# Patient Record
Sex: FEMALE | Race: WHITE | NOT HISPANIC OR LATINO
[De-identification: names, ages, dates, MRNs, and addresses within clinical notes are randomized per-mention and may not be internally consistent; named-entity substitution may affect disease eponyms.]

---

## 2021-09-24 PROBLEM — Z00.00 ENCOUNTER FOR PREVENTIVE HEALTH EXAMINATION: Status: ACTIVE | Noted: 2021-09-24

## 2024-05-10 ENCOUNTER — NON-APPOINTMENT (OUTPATIENT)
Age: 59
End: 2024-05-10

## 2024-05-15 ENCOUNTER — INPATIENT (INPATIENT)
Facility: HOSPITAL | Age: 59
LOS: 8 days | Discharge: HOME | DRG: 195 | End: 2024-05-24
Attending: STUDENT IN AN ORGANIZED HEALTH CARE EDUCATION/TRAINING PROGRAM | Admitting: STUDENT IN AN ORGANIZED HEALTH CARE EDUCATION/TRAINING PROGRAM
Payer: COMMERCIAL

## 2024-05-15 VITALS
HEART RATE: 92 BPM | OXYGEN SATURATION: 84 % | RESPIRATION RATE: 22 BRPM | WEIGHT: 154.98 LBS | HEIGHT: 60 IN | DIASTOLIC BLOOD PRESSURE: 80 MMHG | TEMPERATURE: 99 F | SYSTOLIC BLOOD PRESSURE: 138 MMHG

## 2024-05-15 DIAGNOSIS — A41.9 SEPSIS, UNSPECIFIED ORGANISM: ICD-10-CM

## 2024-05-15 DIAGNOSIS — E87.1 HYPO-OSMOLALITY AND HYPONATREMIA: ICD-10-CM

## 2024-05-15 DIAGNOSIS — J96.01 ACUTE RESPIRATORY FAILURE WITH HYPOXIA: ICD-10-CM

## 2024-05-15 DIAGNOSIS — R74.01 ELEVATION OF LEVELS OF LIVER TRANSAMINASE LEVELS: ICD-10-CM

## 2024-05-15 DIAGNOSIS — Z29.9 ENCOUNTER FOR PROPHYLACTIC MEASURES, UNSPECIFIED: ICD-10-CM

## 2024-05-15 DIAGNOSIS — I10 ESSENTIAL (PRIMARY) HYPERTENSION: ICD-10-CM

## 2024-05-15 DIAGNOSIS — J18.9 PNEUMONIA, UNSPECIFIED ORGANISM: ICD-10-CM

## 2024-05-15 LAB
ADD ON TEST-SPECIMEN IN LAB: SIGNIFICANT CHANGE UP
ALBUMIN SERPL ELPH-MCNC: 3.5 G/DL — SIGNIFICANT CHANGE UP (ref 3.3–5)
ALP SERPL-CCNC: 106 U/L — SIGNIFICANT CHANGE UP (ref 40–120)
ALT FLD-CCNC: 77 U/L — HIGH (ref 10–45)
ANION GAP SERPL CALC-SCNC: 14 MMOL/L — SIGNIFICANT CHANGE UP (ref 5–17)
ANION GAP SERPL CALC-SCNC: 15 MMOL/L — SIGNIFICANT CHANGE UP (ref 5–17)
APPEARANCE UR: CLEAR — SIGNIFICANT CHANGE UP
APTT BLD: 28.4 SEC — SIGNIFICANT CHANGE UP (ref 24.5–35.6)
AST SERPL-CCNC: 41 U/L — HIGH (ref 10–40)
BACTERIA # UR AUTO: NEGATIVE /HPF — SIGNIFICANT CHANGE UP
BASE EXCESS BLDV CALC-SCNC: 1.3 MMOL/L — SIGNIFICANT CHANGE UP (ref -2–3)
BASOPHILS # BLD AUTO: 0.04 K/UL — SIGNIFICANT CHANGE UP (ref 0–0.2)
BASOPHILS NFR BLD AUTO: 0.2 % — SIGNIFICANT CHANGE UP (ref 0–2)
BILIRUB SERPL-MCNC: 0.7 MG/DL — SIGNIFICANT CHANGE UP (ref 0.2–1.2)
BILIRUB UR-MCNC: NEGATIVE — SIGNIFICANT CHANGE UP
BUN SERPL-MCNC: 6 MG/DL — LOW (ref 7–23)
BUN SERPL-MCNC: 8 MG/DL — SIGNIFICANT CHANGE UP (ref 7–23)
CA-I SERPL-SCNC: 1.14 MMOL/L — LOW (ref 1.15–1.33)
CALCIUM SERPL-MCNC: 8.6 MG/DL — SIGNIFICANT CHANGE UP (ref 8.4–10.5)
CALCIUM SERPL-MCNC: 9.1 MG/DL — SIGNIFICANT CHANGE UP (ref 8.4–10.5)
CAST: 2 /LPF — SIGNIFICANT CHANGE UP (ref 0–4)
CHLORIDE BLDV-SCNC: 84 MMOL/L — LOW (ref 96–108)
CHLORIDE SERPL-SCNC: 84 MMOL/L — LOW (ref 96–108)
CHLORIDE SERPL-SCNC: 87 MMOL/L — LOW (ref 96–108)
CO2 BLDV-SCNC: 28 MMOL/L — HIGH (ref 22–26)
CO2 SERPL-SCNC: 22 MMOL/L — SIGNIFICANT CHANGE UP (ref 22–31)
CO2 SERPL-SCNC: 24 MMOL/L — SIGNIFICANT CHANGE UP (ref 22–31)
COLOR SPEC: YELLOW — SIGNIFICANT CHANGE UP
CREAT SERPL-MCNC: 0.5 MG/DL — SIGNIFICANT CHANGE UP (ref 0.5–1.3)
CREAT SERPL-MCNC: 0.51 MG/DL — SIGNIFICANT CHANGE UP (ref 0.5–1.3)
DIFF PNL FLD: ABNORMAL
EGFR: 108 ML/MIN/1.73M2 — SIGNIFICANT CHANGE UP
EGFR: 109 ML/MIN/1.73M2 — SIGNIFICANT CHANGE UP
EOSINOPHIL # BLD AUTO: 0.14 K/UL — SIGNIFICANT CHANGE UP (ref 0–0.5)
EOSINOPHIL NFR BLD AUTO: 0.6 % — SIGNIFICANT CHANGE UP (ref 0–6)
FLUAV AG NPH QL: SIGNIFICANT CHANGE UP
FLUBV AG NPH QL: SIGNIFICANT CHANGE UP
GAS PNL BLDV: 118 MMOL/L — CRITICAL LOW (ref 136–145)
GAS PNL BLDV: SIGNIFICANT CHANGE UP
GLUCOSE BLDV-MCNC: 94 MG/DL — SIGNIFICANT CHANGE UP (ref 70–99)
GLUCOSE SERPL-MCNC: 102 MG/DL — HIGH (ref 70–99)
GLUCOSE SERPL-MCNC: 96 MG/DL — SIGNIFICANT CHANGE UP (ref 70–99)
GLUCOSE UR QL: NEGATIVE MG/DL — SIGNIFICANT CHANGE UP
HCO3 BLDV-SCNC: 27 MMOL/L — SIGNIFICANT CHANGE UP (ref 22–29)
HCT VFR BLD CALC: 34.9 % — SIGNIFICANT CHANGE UP (ref 34.5–45)
HCT VFR BLDA CALC: 37 % — SIGNIFICANT CHANGE UP (ref 34.5–46.5)
HGB BLD CALC-MCNC: 12.2 G/DL — SIGNIFICANT CHANGE UP (ref 11.7–16.1)
HGB BLD-MCNC: 12.2 G/DL — SIGNIFICANT CHANGE UP (ref 11.5–15.5)
IMM GRANULOCYTES NFR BLD AUTO: 3.1 % — HIGH (ref 0–0.9)
INR BLD: 1.26 RATIO — HIGH (ref 0.85–1.18)
KETONES UR-MCNC: ABNORMAL MG/DL
LACTATE BLDV-MCNC: 2 MMOL/L — SIGNIFICANT CHANGE UP (ref 0.5–2)
LACTATE SERPL-SCNC: 1.4 MMOL/L — SIGNIFICANT CHANGE UP (ref 0.5–2)
LEGIONELLA AG UR QL: NEGATIVE — SIGNIFICANT CHANGE UP
LEUKOCYTE ESTERASE UR-ACNC: NEGATIVE — SIGNIFICANT CHANGE UP
LYMPHOCYTES # BLD AUTO: 1.21 K/UL — SIGNIFICANT CHANGE UP (ref 1–3.3)
LYMPHOCYTES # BLD AUTO: 5.5 % — LOW (ref 13–44)
MCHC RBC-ENTMCNC: 29.5 PG — SIGNIFICANT CHANGE UP (ref 27–34)
MCHC RBC-ENTMCNC: 35 GM/DL — SIGNIFICANT CHANGE UP (ref 32–36)
MCV RBC AUTO: 84.5 FL — SIGNIFICANT CHANGE UP (ref 80–100)
MONOCYTES # BLD AUTO: 0.68 K/UL — SIGNIFICANT CHANGE UP (ref 0–0.9)
MONOCYTES NFR BLD AUTO: 3.1 % — SIGNIFICANT CHANGE UP (ref 2–14)
NEUTROPHILS # BLD AUTO: 19.21 K/UL — HIGH (ref 1.8–7.4)
NEUTROPHILS NFR BLD AUTO: 87.5 % — HIGH (ref 43–77)
NITRITE UR-MCNC: NEGATIVE — SIGNIFICANT CHANGE UP
NRBC # BLD: 0 /100 WBCS — SIGNIFICANT CHANGE UP (ref 0–0)
OSMOLALITY SERPL: 250 MOSMOL/KG — LOW (ref 275–300)
OSMOLALITY UR: 350 MOS/KG — SIGNIFICANT CHANGE UP (ref 300–900)
PCO2 BLDV: 44 MMHG — HIGH (ref 39–42)
PH BLDV: 7.39 — SIGNIFICANT CHANGE UP (ref 7.32–7.43)
PH UR: 6 — SIGNIFICANT CHANGE UP (ref 5–8)
PLATELET # BLD AUTO: 389 K/UL — SIGNIFICANT CHANGE UP (ref 150–400)
PO2 BLDV: 30 MMHG — SIGNIFICANT CHANGE UP (ref 25–45)
POTASSIUM BLDV-SCNC: 3.5 MMOL/L — SIGNIFICANT CHANGE UP (ref 3.5–5.1)
POTASSIUM SERPL-MCNC: 3.6 MMOL/L — SIGNIFICANT CHANGE UP (ref 3.5–5.3)
POTASSIUM SERPL-MCNC: 3.6 MMOL/L — SIGNIFICANT CHANGE UP (ref 3.5–5.3)
POTASSIUM SERPL-SCNC: 3.6 MMOL/L — SIGNIFICANT CHANGE UP (ref 3.5–5.3)
POTASSIUM SERPL-SCNC: 3.6 MMOL/L — SIGNIFICANT CHANGE UP (ref 3.5–5.3)
PROCALCITONIN SERPL-MCNC: 0.24 NG/ML — HIGH (ref 0.02–0.1)
PROT SERPL-MCNC: 7 G/DL — SIGNIFICANT CHANGE UP (ref 6–8.3)
PROT UR-MCNC: 30 MG/DL
PROTHROM AB SERPL-ACNC: 13.8 SEC — HIGH (ref 9.5–13)
RBC # BLD: 4.13 M/UL — SIGNIFICANT CHANGE UP (ref 3.8–5.2)
RBC # FLD: 12.9 % — SIGNIFICANT CHANGE UP (ref 10.3–14.5)
RBC CASTS # UR COMP ASSIST: 6 /HPF — HIGH (ref 0–4)
RSV RNA NPH QL NAA+NON-PROBE: SIGNIFICANT CHANGE UP
SAO2 % BLDV: 43.2 % — LOW (ref 67–88)
SARS-COV-2 RNA SPEC QL NAA+PROBE: SIGNIFICANT CHANGE UP
SODIUM SERPL-SCNC: 123 MMOL/L — LOW (ref 135–145)
SODIUM SERPL-SCNC: 123 MMOL/L — LOW (ref 135–145)
SODIUM UR-SCNC: 78 MMOL/L — SIGNIFICANT CHANGE UP
SP GR SPEC: 1.01 — SIGNIFICANT CHANGE UP (ref 1–1.03)
SQUAMOUS # UR AUTO: 2 /HPF — SIGNIFICANT CHANGE UP (ref 0–5)
UROBILINOGEN FLD QL: 0.2 MG/DL — SIGNIFICANT CHANGE UP (ref 0.2–1)
WBC # BLD: 21.95 K/UL — HIGH (ref 3.8–10.5)
WBC # FLD AUTO: 21.95 K/UL — HIGH (ref 3.8–10.5)
WBC UR QL: 1 /HPF — SIGNIFICANT CHANGE UP (ref 0–5)

## 2024-05-15 PROCEDURE — 99285 EMERGENCY DEPT VISIT HI MDM: CPT

## 2024-05-15 PROCEDURE — 99223 1ST HOSP IP/OBS HIGH 75: CPT | Mod: GC

## 2024-05-15 PROCEDURE — 71046 X-RAY EXAM CHEST 2 VIEWS: CPT | Mod: 26

## 2024-05-15 RX ORDER — SODIUM CHLORIDE 9 MG/ML
1400 INJECTION INTRAMUSCULAR; INTRAVENOUS; SUBCUTANEOUS ONCE
Refills: 0 | Status: COMPLETED | OUTPATIENT
Start: 2024-05-15 | End: 2024-05-15

## 2024-05-15 RX ORDER — AZITHROMYCIN 500 MG/1
500 TABLET, FILM COATED ORAL ONCE
Refills: 0 | Status: COMPLETED | OUTPATIENT
Start: 2024-05-15 | End: 2024-05-15

## 2024-05-15 RX ORDER — SODIUM CHLORIDE 9 MG/ML
1 INJECTION INTRAMUSCULAR; INTRAVENOUS; SUBCUTANEOUS
Refills: 0 | Status: DISCONTINUED | OUTPATIENT
Start: 2024-05-15 | End: 2024-05-16

## 2024-05-15 RX ORDER — IPRATROPIUM/ALBUTEROL SULFATE 18-103MCG
3 AEROSOL WITH ADAPTER (GRAM) INHALATION ONCE
Refills: 0 | Status: COMPLETED | OUTPATIENT
Start: 2024-05-15 | End: 2024-05-15

## 2024-05-15 RX ORDER — ONDANSETRON 8 MG/1
4 TABLET, FILM COATED ORAL EVERY 8 HOURS
Refills: 0 | Status: DISCONTINUED | OUTPATIENT
Start: 2024-05-15 | End: 2024-05-24

## 2024-05-15 RX ORDER — AZITHROMYCIN 500 MG/1
500 TABLET, FILM COATED ORAL DAILY
Refills: 0 | Status: COMPLETED | OUTPATIENT
Start: 2024-05-16 | End: 2024-05-17

## 2024-05-15 RX ORDER — ACETAMINOPHEN 500 MG
1000 TABLET ORAL ONCE
Refills: 0 | Status: COMPLETED | OUTPATIENT
Start: 2024-05-15 | End: 2024-05-15

## 2024-05-15 RX ORDER — IPRATROPIUM/ALBUTEROL SULFATE 18-103MCG
3 AEROSOL WITH ADAPTER (GRAM) INHALATION EVERY 6 HOURS
Refills: 0 | Status: DISCONTINUED | OUTPATIENT
Start: 2024-05-15 | End: 2024-05-16

## 2024-05-15 RX ORDER — ATORVASTATIN CALCIUM 80 MG/1
1 TABLET, FILM COATED ORAL
Refills: 0 | DISCHARGE

## 2024-05-15 RX ORDER — ATORVASTATIN CALCIUM 80 MG/1
10 TABLET, FILM COATED ORAL AT BEDTIME
Refills: 0 | Status: DISCONTINUED | OUTPATIENT
Start: 2024-05-15 | End: 2024-05-24

## 2024-05-15 RX ORDER — POTASSIUM CHLORIDE 20 MEQ
40 PACKET (EA) ORAL ONCE
Refills: 0 | Status: COMPLETED | OUTPATIENT
Start: 2024-05-15 | End: 2024-05-15

## 2024-05-15 RX ORDER — CEFTRIAXONE 500 MG/1
1000 INJECTION, POWDER, FOR SOLUTION INTRAMUSCULAR; INTRAVENOUS EVERY 24 HOURS
Refills: 0 | Status: COMPLETED | OUTPATIENT
Start: 2024-05-16 | End: 2024-05-17

## 2024-05-15 RX ORDER — ACETAMINOPHEN 500 MG
650 TABLET ORAL ONCE
Refills: 0 | Status: COMPLETED | OUTPATIENT
Start: 2024-05-15 | End: 2024-05-15

## 2024-05-15 RX ORDER — CEFTRIAXONE 500 MG/1
1000 INJECTION, POWDER, FOR SOLUTION INTRAMUSCULAR; INTRAVENOUS ONCE
Refills: 0 | Status: COMPLETED | OUTPATIENT
Start: 2024-05-15 | End: 2024-05-15

## 2024-05-15 RX ORDER — ENOXAPARIN SODIUM 100 MG/ML
40 INJECTION SUBCUTANEOUS ONCE
Refills: 0 | Status: COMPLETED | OUTPATIENT
Start: 2024-05-15 | End: 2024-05-15

## 2024-05-15 RX ADMIN — Medication 650 MILLIGRAM(S): at 14:53

## 2024-05-15 RX ADMIN — SODIUM CHLORIDE 1400 MILLILITER(S): 9 INJECTION INTRAMUSCULAR; INTRAVENOUS; SUBCUTANEOUS at 11:28

## 2024-05-15 RX ADMIN — Medication 3 MILLILITER(S): at 15:50

## 2024-05-15 RX ADMIN — Medication 40 MILLIEQUIVALENT(S): at 21:16

## 2024-05-15 RX ADMIN — CEFTRIAXONE 100 MILLIGRAM(S): 500 INJECTION, POWDER, FOR SOLUTION INTRAMUSCULAR; INTRAVENOUS at 11:25

## 2024-05-15 RX ADMIN — Medication 1000 MILLIGRAM(S): at 22:33

## 2024-05-15 RX ADMIN — Medication 400 MILLIGRAM(S): at 21:16

## 2024-05-15 RX ADMIN — ATORVASTATIN CALCIUM 10 MILLIGRAM(S): 80 TABLET, FILM COATED ORAL at 21:16

## 2024-05-15 RX ADMIN — SODIUM CHLORIDE 1 GRAM(S): 9 INJECTION INTRAMUSCULAR; INTRAVENOUS; SUBCUTANEOUS at 21:35

## 2024-05-15 RX ADMIN — ENOXAPARIN SODIUM 40 MILLIGRAM(S): 100 INJECTION SUBCUTANEOUS at 21:19

## 2024-05-15 RX ADMIN — AZITHROMYCIN 500 MILLIGRAM(S): 500 TABLET, FILM COATED ORAL at 13:59

## 2024-05-15 NOTE — ED PROVIDER NOTE - PHYSICAL EXAMINATION
CONSTITUTIONAL: In no apparent distress.  ENT: Airway patent, moist mucous membranes.   EYES: Pupils equal, round and reactive to light. EOMI. Conjunctiva normal appearing.   CARDIAC: Normal rate, regular rhythm.  Heart sounds S1, S2.    RESPIRATORY:  Diffuse rhonchi. Hypoxic to 88% on RA, improved to 95% on 3 L nasal cannula. No accessory muscle use. Speaking in full sentences. No resp distress.   GASTROINTESTINAL: Abdomen soft, non-tender, not distended.  MUSCULOSKELETAL: Spine appears normal. Legs symmetric appearing. No BLE edema or swelling. No calf TTP.   NEUROLOGICAL: Alert and oriented x3, non focal.

## 2024-05-15 NOTE — H&P ADULT - PROBLEM SELECTOR PLAN 1
On 4L NC, productive cough  CURB65 0, PSI: 48 points -> grade 2  Wells score 0  Etiology: Acute Bacterial Pneumonia (legionella high possibility) vs PE. Less common AIP. Low likelihood of cardiac etiology    Plan:  -ceftriaxone 1 g IV for 5 day course  -azithromycin for 3 day course  -duoneb 3g q6h  -if no resolution of symptoms consider CT Chest or CTPE  -f/u MRSA, urine legionella, urine strep  -f/u blood cultures, UA On 4L NC, productive cough  CURB65 0, PSI: 48 points -> grade 2  Wells score 1  Etiology: Acute Bacterial Pneumonia (legionella high possibility) vs PE. Less common AIP. Low likelihood of cardiac etiology    Plan:  -ceftriaxone 1 g IV for 5 day course  -azithromycin for 3 day course  -duoneb 3g q6h PRN for dyspnea  -if no resolution or worsening of symptoms consider CTPE  -f/u MRSA, urine legionella  -f/u blood cultures, UA On 4L NC, productive cough  CURB65 0, PSI: 48 points -> grade 2  Wells score 1, BNP and trops unremarkable  Etiology: Acute Bacterial Pneumonia (legionella high possibility) vs PE. Less common AIP. Low likelihood of cardiac etiology    Plan:  -ceftriaxone 1 g IV for 5 day course  -azithromycin for 3 day course  -duoneb 3g q6h PRN for dyspnea  -if no resolution or worsening of symptoms consider CTPE  -f/u MRSA, urine legionella  -f/u blood cultures, UA

## 2024-05-15 NOTE — H&P ADULT - NSHPLABSRESULTS_GEN_ALL_CORE
(05-15 @ 11:54)                      12.2  21.95 )-----------( 389                 34.9    Neutrophils = 19.21 (87.5%)  Lymphocytes = 1.21 (5.5%)  Eosinophils = 0.14 (0.6%)  Basophils = 0.04 (0.2%)  Monocytes = 0.68 (3.1%)  Bands = --%    05-15    123<L>  |  84<L>  |  8   ----------------------------<  102<H>  3.6   |  24  |  0.51    Ca    9.1      15 May 2024 11:54    TPro  7.0  /  Alb  3.5  /  TBili  0.7  /  DBili  x   /  AST  41<H>  /  ALT  77<H>  /  AlkPhos  106  05-15    ( 15 May 2024 11:54 )   PT: 13.8 sec;   INR: 1.26 ratio;       PTT:28.4 sec      RVP:    Venous Blood Gas:  05-15 @ 11:30  7.39/44/30/27/43.2  VBG Lactate: 2.0        Tox:         Urinalysis Basic - ( 15 May 2024 11:54 )    Color: x / Appearance: x / SG: x / pH: x  Gluc: 102 mg/dL / Ketone: x  / Bili: x / Urobili: x   Blood: x / Protein: x / Nitrite: x   Leuk Esterase: x / RBC: x / WBC x   Sq Epi: x / Non Sq Epi: x / Bacteria: x

## 2024-05-15 NOTE — H&P ADULT - ATTENDING COMMENTS
Agree with above with following addendum:  59 year old female presents with 10 days of fevers with associated nausea and diarrhea. Seen at urgent care, given augmentin without improvement. Saw PMD who took CXR and labs, sent patient to eD. of note, diarrhea started prior to Abx.   In ED, found to have multifocal pna, hyponatremia, mild elevation of transmainitis.   At time of my evaluation patient still with significant SOB, able to speak, but has to stop intermittently to catch her breath. +fever, cough (mostly non-productive). nausea. No sore throat, dysphagia or ordynophagia]    Exam as above: In addition:  Lymph: No cervical or submandibular LAD  ENT: No tonsillar exudate  Unofficial POCUS lungs: Diffuse b-lines with irregular pleura. No consolidation pattern, no effusions.     CXrR personally reviewed: multifocal GGO throughotu lung. No consolidaiton or effusion   EcG personally reviewed: Sinus tachycardia without evidence of ST segment changes.     #Sepsis 2/2 multifocal pna  -given diarrhea, hyponatremia and mild transaminitis with failure of improvement with typical Abx coverave highly suspect legionella PNA  -continue with azithromycin, mwill also cover typical organisms for now  -urine legionella, Bcx sent.   -antipyretics with tylenol.ibuprofen/ice packs  -maintain MAP>65  -encourage po intake given concern for SIADH  -discussed c/b of CT chest. If no improvement in next 24 hours will obtain, as of now, do not think any findings would change current management so can hold off currently.     #hyponatremia  -pre-renal vs. SIADH  -patient not eating much, however legnionella can cause SiADH.   -UA with normal specific gravity, not concentrated makes SIADH sligthly more likely  -s/p 1.4L  -hold furhter fluids  -repeat BMP, serum and urine osm, urine NA  -pending results will determine fluid restriction vs. more IVF  -goal increase 6-8mEq next 24h hours  -BMP q6-8hours.    rest as above

## 2024-05-15 NOTE — H&P ADULT - NSICDXFAMILYHX_GEN_ALL_CORE_FT
FAMILY HISTORY:  FH: HTN (hypertension)    Sibling  Still living? No  FH: colon cancer, Age at diagnosis: Age Unknown

## 2024-05-15 NOTE — ED ADULT NURSE NOTE - FINAL NURSING ELECTRONIC SIGNATURE
CC:  Is here for a recheck on meds.   Is due for a new to medicare wellness.       Denies known Latex allergy or symptoms of Latex sensitivity.  Medications verified, no changes.       15-May-2024 18:41

## 2024-05-15 NOTE — H&P ADULT - ASSESSMENT
Martha is a 59F with HTN who present to the hospital for 10 days of fever and dyspnea and admitted for acute hypoxemic respiratory failure.  Martha is a 59F with HTN who present to the hospital for 10 days of fever and dyspnea and admitted for acute hypoxemic respiratory failure with suspected CAP.

## 2024-05-15 NOTE — H&P ADULT - HISTORY OF PRESENT ILLNESS
Martha is a 59F with HTN who present to the hospital for 10 days of fever and dyspnea per request by her PCP. The fever and chills happened first and then was accompanied by a productive cough, congestion, nausea and diarrhea and poor appetite. As the days went on, she became dyspneic, especially on exertion. She saw urgent care on 05/11 where she got an xray and augmentin. On 05/14, she saw her PCP where she got another xray and bloodwork. Due to chest xray findings and lack of resolution of symptoms her PCP recommended going to the emergency room for further management.     In the ED, VS noteworthy for RR of mid 20s, 4L NC, tachycardia and temperature of 101.5. The patient received 1.4L of fluid along with ceftriaxone and azithromycin and tylenol and admitted for further management. Martha is a 59F with HTN who present to the hospital for 10 days of fever and dyspnea per request by her PCP. The fever and chills happened first and then was accompanied by a productive cough, congestion, nausea and diarrhea and poor appetite. As the days went on, she became dyspneic, especially on exertion. She saw urgent care on 05/11 where she got an xray and augmentin. On 05/14, she saw her PCP where she got another xray and bloodwork. Due to abnormal labs, chest xray findings and lack of resolution of symptoms her PCP recommended going to the emergency room for further management.     In the ED, VS noteworthy for RR of mid 20s, 4L NC, tachycardia and temperature of 101.5. The patient received 1.4L of fluid along with ceftriaxone and azithromycin and tylenol and admitted for further management.

## 2024-05-15 NOTE — H&P ADULT - NSHPREVIEWOFSYSTEMS_GEN_ALL_CORE
REVIEW OF SYSTEMS:  CONSTITUTIONAL:  No weakness, fatigue. Positive fevers, chills and sweats.  EYES/ENT:  No visual changes;  No vertigo or throat pain   NECK:  No pain or stiffness  RESPIRATORY:  Cough and wheezing. No hemoptysis; Positive shortness of breath  CARDIOVASCULAR:  No chest pain or palpitations  GASTROINTESTINAL:  No abdominal or epigastric pain. Nausea, no vomiting, or hematemesis; diarrhea present with straining. no or constipation. No melena or hematochezia.  GENITOURINARY:  No dysuria, frequency or hematuria  NEUROLOGICAL:  No numbness or weakness  SKIN:  No itching, rashes

## 2024-05-15 NOTE — H&P ADULT - PROBLEM SELECTOR PLAN 2
Likely in setting of infection. Legionella is associated with SIADH    Plan:  -f/u urine osm  -ctm Na Likely in setting of infection. 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

## 2024-05-15 NOTE — ED PROVIDER NOTE - ATTENDING APP SHARED VISIT CONTRIBUTION OF CARE
Dr. Reeder: I performed a face to face bedside interview with patient regarding history of present illness, review of symptoms and past medical history. I completed an independent physical exam.  I have discussed patient's plan of care with PA.   I agree with note as stated above, having amended the EMR as needed to reflect my findings.   This includes HISTORY OF PRESENT ILLNESS, HIV, PAST MEDICAL/SURGICAL/FAMILY/SOCIAL HISTORY, ALLERGIES AND HOME MEDICATIONS, REVIEW OF SYSTEMS, PHYSICAL EXAM, and any PROGRESS NOTES during the time I functioned as the attending physician for this patient.    see mdm

## 2024-05-15 NOTE — ED ADULT NURSE NOTE - OBJECTIVE STATEMENT
58 y/o Female presents to ED from home with c/o fever. PMH of HTN, . Pt states fever began on May 5th and has been ongoing along with developed cough and mucus production. Tmax 102 fever, pt has been taking ATC advil and tylenol for temp. Pt went to  and was recommended to come to ER for possible pneumonia, low O2 and elevated liver enzymes. Pt endorses SOB. Denies CP, HA, N/V, abd pain, urinary s/s. Pt is A&Ox3 speaking full sentences without difficulty. Airway patent with spontaneous breathing Pt sating 86% on RA placed on 4L NC, Equal B/L upper and lower extremity strength. No diaphoresis or edema noted. Pt placed on continuous cardiac monitor. IV inserted labs drawn and sent. Safety maintained bed is in the lowest position, locked and call bell in reach.

## 2024-05-15 NOTE — H&P ADULT - PROBLEM SELECTOR PLAN 3
Mild transaminitis  Etiology: drug induced vs MASLD    Plan:  -ctm  -hold atorvastatin  -liver dose tylenol, 2 grams max  -if persistent, can consider RUQ US Mild transaminitis  Etiology: drug induced vs MASLD    Plan:  -ctm  -hold atorvastatin  -if persistent, can consider RUQ US

## 2024-05-15 NOTE — H&P ADULT - NSHPSOCIALHISTORY_GEN_ALL_CORE
Lives with  and son. Works as a . No alcohol, smoking or drug usage. Lives with  and son. Works as a  with only dogs and cats, no birds. No alcohol, smoking or drug usage.

## 2024-05-15 NOTE — ED PROVIDER NOTE - CLINICAL SUMMARY MEDICAL DECISION MAKING FREE TEXT BOX
Dr. Reeder: 59-year-old female history of hypertension, presenting with 10 days of fever Tmax 102, productive cough, unsure of the color of the sputum, shortness of breath, no chest pain.  Went to urgent care and was diagnosed with bronchitis, given Augmentin, but symptoms have persisted so she came in.  Had some diarrhea.  No travel, no sick contacts.    Gen: Minimal distress as patient tachypneic   HEENT: Mucous membranes moist, pink conjunctivae, EOMI  CV: RRR, no clubbing/cyanosis/edema  Resp: Diffuse rhonchi, patient hypoxic to 88% on room air, up to 95% on 3 L nasal cannula.  GI: Abdomen soft, NT, ND. Normal BS. No rebound, no guarding  : No CVAT  Neuro: A&O x 3, moving all 4 extremities  MSK: No spine or joint tenderness to palpation, no pedal edema or calf tenderness to palpation.  Skin: No rashes    Patient presenting with likely community-acquired pneumonia not improved with p.o. antibiotics, will give IV antibiotics, provide O2, will need admission given patient was hypoxic on room air.  PE unlikely as patient has several infectious symptoms.

## 2024-05-15 NOTE — H&P ADULT - NSHPPHYSICALEXAM_GEN_ALL_CORE
LOS:     VITALS:   T(C): 38.5 (05-15-24 @ 14:12), Max: 38.5 (05-15-24 @ 14:12)  HR: 108 (05-15-24 @ 14:12) (92 - 108)  BP: 154/86 (05-15-24 @ 14:12) (138/80 - 154/86)  RR: 25 (05-15-24 @ 14:12) (21 - 25)  SpO2: 96% (05-15-24 @ 14:12) (84% - 96%)    GENERAL: NAD, lying in bed comfortably  HEAD:  Atraumatic, Normocephalic  EYES: EOMI, PERRLA, conjunctiva and sclera clear  ENT: Moist mucous membranes  NECK: Supple, nontender, no lymphadenopathy  CHEST/LUNG: increased mild diffuse expiratory wheezes, no crackles   HEART: Regular rate and rhythm; No murmurs, rubs, or gallops  ABDOMEN: BSx4; Soft, nontender, nondistended  EXTREMITIES:  2+ Peripheral Pulses, brisk capillary refill. No clubbing, cyanosis, or edema  NERVOUS SYSTEM:  A&Ox3, no focal deficits   SKIN: No rashes or lesions

## 2024-05-16 DIAGNOSIS — R19.7 DIARRHEA, UNSPECIFIED: ICD-10-CM

## 2024-05-16 LAB
ALBUMIN SERPL ELPH-MCNC: 3.1 G/DL — LOW (ref 3.3–5)
ALP SERPL-CCNC: 105 U/L — SIGNIFICANT CHANGE UP (ref 40–120)
ALT FLD-CCNC: 69 U/L — HIGH (ref 10–45)
ANION GAP SERPL CALC-SCNC: 10 MMOL/L — SIGNIFICANT CHANGE UP (ref 5–17)
ANION GAP SERPL CALC-SCNC: 13 MMOL/L — SIGNIFICANT CHANGE UP (ref 5–17)
ANION GAP SERPL CALC-SCNC: 13 MMOL/L — SIGNIFICANT CHANGE UP (ref 5–17)
ANION GAP SERPL CALC-SCNC: 16 MMOL/L — SIGNIFICANT CHANGE UP (ref 5–17)
AST SERPL-CCNC: 52 U/L — HIGH (ref 10–40)
BASOPHILS # BLD AUTO: 0.08 K/UL — SIGNIFICANT CHANGE UP (ref 0–0.2)
BASOPHILS NFR BLD AUTO: 0.3 % — SIGNIFICANT CHANGE UP (ref 0–2)
BILIRUB SERPL-MCNC: 0.6 MG/DL — SIGNIFICANT CHANGE UP (ref 0.2–1.2)
BUN SERPL-MCNC: 6 MG/DL — LOW (ref 7–23)
BUN SERPL-MCNC: 6 MG/DL — LOW (ref 7–23)
BUN SERPL-MCNC: 7 MG/DL — SIGNIFICANT CHANGE UP (ref 7–23)
BUN SERPL-MCNC: 7 MG/DL — SIGNIFICANT CHANGE UP (ref 7–23)
CALCIUM SERPL-MCNC: 8.5 MG/DL — SIGNIFICANT CHANGE UP (ref 8.4–10.5)
CALCIUM SERPL-MCNC: 8.5 MG/DL — SIGNIFICANT CHANGE UP (ref 8.4–10.5)
CALCIUM SERPL-MCNC: 8.6 MG/DL — SIGNIFICANT CHANGE UP (ref 8.4–10.5)
CALCIUM SERPL-MCNC: 8.9 MG/DL — SIGNIFICANT CHANGE UP (ref 8.4–10.5)
CHLORIDE SERPL-SCNC: 86 MMOL/L — LOW (ref 96–108)
CHLORIDE SERPL-SCNC: 88 MMOL/L — LOW (ref 96–108)
CHLORIDE SERPL-SCNC: 88 MMOL/L — LOW (ref 96–108)
CHLORIDE SERPL-SCNC: 93 MMOL/L — LOW (ref 96–108)
CO2 SERPL-SCNC: 23 MMOL/L — SIGNIFICANT CHANGE UP (ref 22–31)
CO2 SERPL-SCNC: 25 MMOL/L — SIGNIFICANT CHANGE UP (ref 22–31)
CREAT SERPL-MCNC: 0.42 MG/DL — LOW (ref 0.5–1.3)
CREAT SERPL-MCNC: 0.45 MG/DL — LOW (ref 0.5–1.3)
CREAT SERPL-MCNC: 0.51 MG/DL — SIGNIFICANT CHANGE UP (ref 0.5–1.3)
CREAT SERPL-MCNC: 0.51 MG/DL — SIGNIFICANT CHANGE UP (ref 0.5–1.3)
EBV EA AB SER IA-ACNC: <5 U/ML — SIGNIFICANT CHANGE UP
EBV EA AB TITR SER IF: POSITIVE
EBV EA IGG SER-ACNC: NEGATIVE — SIGNIFICANT CHANGE UP
EBV NA IGG SER IA-ACNC: 211 U/ML — HIGH
EBV PATRN SPEC IB-IMP: SIGNIFICANT CHANGE UP
EBV VCA IGG AVIDITY SER QL IA: POSITIVE
EBV VCA IGM SER IA-ACNC: 596 U/ML — HIGH
EBV VCA IGM SER IA-ACNC: <10 U/ML — SIGNIFICANT CHANGE UP
EBV VCA IGM TITR FLD: NEGATIVE — SIGNIFICANT CHANGE UP
EGFR: 108 ML/MIN/1.73M2 — SIGNIFICANT CHANGE UP
EGFR: 108 ML/MIN/1.73M2 — SIGNIFICANT CHANGE UP
EGFR: 111 ML/MIN/1.73M2 — SIGNIFICANT CHANGE UP
EGFR: 113 ML/MIN/1.73M2 — SIGNIFICANT CHANGE UP
EOSINOPHIL # BLD AUTO: 0.18 K/UL — SIGNIFICANT CHANGE UP (ref 0–0.5)
EOSINOPHIL NFR BLD AUTO: 0.8 % — SIGNIFICANT CHANGE UP (ref 0–6)
GLUCOSE SERPL-MCNC: 108 MG/DL — HIGH (ref 70–99)
GLUCOSE SERPL-MCNC: 131 MG/DL — HIGH (ref 70–99)
GLUCOSE SERPL-MCNC: 86 MG/DL — SIGNIFICANT CHANGE UP (ref 70–99)
GLUCOSE SERPL-MCNC: 95 MG/DL — SIGNIFICANT CHANGE UP (ref 70–99)
HCT VFR BLD CALC: 35.6 % — SIGNIFICANT CHANGE UP (ref 34.5–45)
HGB BLD-MCNC: 12.1 G/DL — SIGNIFICANT CHANGE UP (ref 11.5–15.5)
IMM GRANULOCYTES NFR BLD AUTO: 3.1 % — HIGH (ref 0–0.9)
LYMPHOCYTES # BLD AUTO: 2.07 K/UL — SIGNIFICANT CHANGE UP (ref 1–3.3)
LYMPHOCYTES # BLD AUTO: 9 % — LOW (ref 13–44)
MAGNESIUM SERPL-MCNC: 2.1 MG/DL — SIGNIFICANT CHANGE UP (ref 1.6–2.6)
MCHC RBC-ENTMCNC: 29.8 PG — SIGNIFICANT CHANGE UP (ref 27–34)
MCHC RBC-ENTMCNC: 34 GM/DL — SIGNIFICANT CHANGE UP (ref 32–36)
MCV RBC AUTO: 87.7 FL — SIGNIFICANT CHANGE UP (ref 80–100)
MONOCYTES # BLD AUTO: 0.99 K/UL — HIGH (ref 0–0.9)
MONOCYTES NFR BLD AUTO: 4.3 % — SIGNIFICANT CHANGE UP (ref 2–14)
MRSA PCR RESULT.: SIGNIFICANT CHANGE UP
NEUTROPHILS # BLD AUTO: 18.99 K/UL — HIGH (ref 1.8–7.4)
NEUTROPHILS NFR BLD AUTO: 82.5 % — HIGH (ref 43–77)
NRBC # BLD: 0 /100 WBCS — SIGNIFICANT CHANGE UP (ref 0–0)
PHOSPHATE SERPL-MCNC: 3 MG/DL — SIGNIFICANT CHANGE UP (ref 2.5–4.5)
PLATELET # BLD AUTO: 393 K/UL — SIGNIFICANT CHANGE UP (ref 150–400)
POTASSIUM SERPL-MCNC: 3.4 MMOL/L — LOW (ref 3.5–5.3)
POTASSIUM SERPL-MCNC: 3.7 MMOL/L — SIGNIFICANT CHANGE UP (ref 3.5–5.3)
POTASSIUM SERPL-MCNC: 3.8 MMOL/L — SIGNIFICANT CHANGE UP (ref 3.5–5.3)
POTASSIUM SERPL-MCNC: 3.8 MMOL/L — SIGNIFICANT CHANGE UP (ref 3.5–5.3)
POTASSIUM SERPL-SCNC: 3.4 MMOL/L — LOW (ref 3.5–5.3)
POTASSIUM SERPL-SCNC: 3.7 MMOL/L — SIGNIFICANT CHANGE UP (ref 3.5–5.3)
POTASSIUM SERPL-SCNC: 3.8 MMOL/L — SIGNIFICANT CHANGE UP (ref 3.5–5.3)
POTASSIUM SERPL-SCNC: 3.8 MMOL/L — SIGNIFICANT CHANGE UP (ref 3.5–5.3)
PROT SERPL-MCNC: 6.6 G/DL — SIGNIFICANT CHANGE UP (ref 6–8.3)
RBC # BLD: 4.06 M/UL — SIGNIFICANT CHANGE UP (ref 3.8–5.2)
RBC # FLD: 13.1 % — SIGNIFICANT CHANGE UP (ref 10.3–14.5)
S AUREUS DNA NOSE QL NAA+PROBE: SIGNIFICANT CHANGE UP
S PNEUM AG UR QL: NEGATIVE — SIGNIFICANT CHANGE UP
SODIUM SERPL-SCNC: 122 MMOL/L — LOW (ref 135–145)
SODIUM SERPL-SCNC: 124 MMOL/L — LOW (ref 135–145)
SODIUM SERPL-SCNC: 127 MMOL/L — LOW (ref 135–145)
SODIUM SERPL-SCNC: 128 MMOL/L — LOW (ref 135–145)
WBC # BLD: 23.02 K/UL — HIGH (ref 3.8–10.5)
WBC # FLD AUTO: 23.02 K/UL — HIGH (ref 3.8–10.5)

## 2024-05-16 PROCEDURE — 99232 SBSQ HOSP IP/OBS MODERATE 35: CPT | Mod: GC

## 2024-05-16 PROCEDURE — 99221 1ST HOSP IP/OBS SF/LOW 40: CPT

## 2024-05-16 RX ORDER — SODIUM CHLORIDE 9 MG/ML
4 INJECTION INTRAMUSCULAR; INTRAVENOUS; SUBCUTANEOUS ONCE
Refills: 0 | Status: DISCONTINUED | OUTPATIENT
Start: 2024-05-16 | End: 2024-05-16

## 2024-05-16 RX ORDER — IPRATROPIUM/ALBUTEROL SULFATE 18-103MCG
3 AEROSOL WITH ADAPTER (GRAM) INHALATION EVERY 6 HOURS
Refills: 0 | Status: DISCONTINUED | OUTPATIENT
Start: 2024-05-16 | End: 2024-05-20

## 2024-05-16 RX ORDER — AZITHROMYCIN 500 MG/1
500 TABLET, FILM COATED ORAL DAILY
Refills: 0 | Status: COMPLETED | OUTPATIENT
Start: 2024-05-18 | End: 2024-05-21

## 2024-05-16 RX ORDER — IPRATROPIUM/ALBUTEROL SULFATE 18-103MCG
3 AEROSOL WITH ADAPTER (GRAM) INHALATION EVERY 6 HOURS
Refills: 0 | Status: DISCONTINUED | OUTPATIENT
Start: 2024-05-16 | End: 2024-05-16

## 2024-05-16 RX ORDER — SODIUM CHLORIDE 9 MG/ML
3 INJECTION INTRAMUSCULAR; INTRAVENOUS; SUBCUTANEOUS THREE TIMES A DAY
Refills: 0 | Status: DISCONTINUED | OUTPATIENT
Start: 2024-05-16 | End: 2024-05-16

## 2024-05-16 RX ORDER — LOSARTAN POTASSIUM 100 MG/1
25 TABLET, FILM COATED ORAL DAILY
Refills: 0 | Status: DISCONTINUED | OUTPATIENT
Start: 2024-05-16 | End: 2024-05-24

## 2024-05-16 RX ORDER — CHLORHEXIDINE GLUCONATE 213 G/1000ML
1 SOLUTION TOPICAL DAILY
Refills: 0 | Status: DISCONTINUED | OUTPATIENT
Start: 2024-05-16 | End: 2024-05-24

## 2024-05-16 RX ORDER — SODIUM CHLORIDE 9 MG/ML
2 INJECTION INTRAMUSCULAR; INTRAVENOUS; SUBCUTANEOUS THREE TIMES A DAY
Refills: 0 | Status: DISCONTINUED | OUTPATIENT
Start: 2024-05-16 | End: 2024-05-18

## 2024-05-16 RX ORDER — LACTOBACILLUS ACIDOPHILUS 100MM CELL
1 CAPSULE ORAL DAILY
Refills: 0 | Status: DISCONTINUED | OUTPATIENT
Start: 2024-05-16 | End: 2024-05-24

## 2024-05-16 RX ORDER — UBIDECARENONE 100 MG
1 CAPSULE ORAL
Refills: 0 | DISCHARGE

## 2024-05-16 RX ORDER — CHOLECALCIFEROL (VITAMIN D3) 125 MCG
1 CAPSULE ORAL
Refills: 0 | DISCHARGE

## 2024-05-16 RX ORDER — ENOXAPARIN SODIUM 100 MG/ML
40 INJECTION SUBCUTANEOUS EVERY 24 HOURS
Refills: 0 | Status: DISCONTINUED | OUTPATIENT
Start: 2024-05-16 | End: 2024-05-24

## 2024-05-16 RX ADMIN — CEFTRIAXONE 100 MILLIGRAM(S): 500 INJECTION, POWDER, FOR SOLUTION INTRAMUSCULAR; INTRAVENOUS at 06:18

## 2024-05-16 RX ADMIN — Medication 3 MILLILITER(S): at 12:12

## 2024-05-16 RX ADMIN — SODIUM CHLORIDE 2 GRAM(S): 9 INJECTION INTRAMUSCULAR; INTRAVENOUS; SUBCUTANEOUS at 21:25

## 2024-05-16 RX ADMIN — Medication 1 TABLET(S): at 12:09

## 2024-05-16 RX ADMIN — AZITHROMYCIN 500 MILLIGRAM(S): 500 TABLET, FILM COATED ORAL at 12:13

## 2024-05-16 RX ADMIN — ENOXAPARIN SODIUM 40 MILLIGRAM(S): 100 INJECTION SUBCUTANEOUS at 18:44

## 2024-05-16 RX ADMIN — SODIUM CHLORIDE 3 GRAM(S): 9 INJECTION INTRAMUSCULAR; INTRAVENOUS; SUBCUTANEOUS at 12:10

## 2024-05-16 RX ADMIN — CHLORHEXIDINE GLUCONATE 1 APPLICATION(S): 213 SOLUTION TOPICAL at 12:10

## 2024-05-16 RX ADMIN — SODIUM CHLORIDE 1 GRAM(S): 9 INJECTION INTRAMUSCULAR; INTRAVENOUS; SUBCUTANEOUS at 05:07

## 2024-05-16 RX ADMIN — Medication 3 MILLILITER(S): at 18:44

## 2024-05-16 RX ADMIN — Medication 3 MILLILITER(S): at 06:22

## 2024-05-16 RX ADMIN — ATORVASTATIN CALCIUM 10 MILLIGRAM(S): 80 TABLET, FILM COATED ORAL at 21:25

## 2024-05-16 NOTE — DIETITIAN INITIAL EVALUATION ADULT - PROBLEM SELECTOR PLAN 1
On 4L NC, productive cough  CURB65 0, PSI: 48 points -> grade 2  Wells score 1, BNP and trops unremarkable  Etiology: Acute Bacterial Pneumonia (legionella high possibility) vs PE. Less common AIP. Low likelihood of cardiac etiology    Plan:  -ceftriaxone 1 g IV for 5 day course  -azithromycin for 3 day course  -duoneb 3g q6h PRN for dyspnea  -if no resolution or worsening of symptoms consider CTPE  -f/u MRSA, urine legionella  -f/u blood cultures, UA

## 2024-05-16 NOTE — PATIENT PROFILE ADULT - FALL HARM RISK - HARM RISK INTERVENTIONS

## 2024-05-16 NOTE — DIETITIAN INITIAL EVALUATION ADULT - REASON
Nutrition focused physical exam deferred at this time, No overt signs of depletion observed during interview. 
[employer]

## 2024-05-16 NOTE — DIETITIAN INITIAL EVALUATION ADULT - PERTINENT LABORATORY DATA
05-16    122<L>  |  86<L>  |  6<L>  ----------------------------<  86  3.7   |  23  |  0.42<L>    Ca    8.5      16 May 2024 07:01  Phos  3.0     05-16  Mg     2.1     05-16    TPro  6.6  /  Alb  3.1<L>  /  TBili  0.6  /  DBili  x   /  AST  52<H>  /  ALT  69<H>  /  AlkPhos  105  05-16

## 2024-05-16 NOTE — PHARMACOTHERAPY INTERVENTION NOTE - COMMENTS
Performed medication reconciliation and home medication list updated in prescription writer/outpatient medication review. Medications verified with patient and pharmacy.     Home Pharmacy: [facility]  Allergies: Sulfa drugs (hives)    Home Medications:  atenolol 25 mg oral tablet: 1 tab(s) orally once a day  atorvastatin 10 mg oral tablet: 1 tab(s) orally once a day  cholecalciferol 25 mcg (1000 intl units) oral tablet: 1 tab(s) orally once a day  losartan 25 mg oral tablet: 1 tab(s) orally once a day  losartan 50 mg oral tablet: 1 tab(s) orally once a day    MEDICATIONS  (STANDING):  atorvastatin 10 milliGRAM(s) Oral at bedtime  azithromycin   Tablet 500 milliGRAM(s) Oral daily  cefTRIAXone   IVPB 1000 milliGRAM(s) IV Intermittent every 24 hours  lactobacillus acidophilus 1 Tablet(s) Oral daily  sodium chloride 3 Gram(s) Oral three times a day    Recommendations:   Missing her home blood pressure medications, changed losartan 25 mg to 50 mg on home med rec. Recommend adding on losartan 50 mg orally once daily and atenolol 25 mg orally once daily when appropriate.  Patient is being treated empirically with azithromycin for pneumonia. Legionella Antigen, Urine culture returned and result is negative. Recommend to discontinue Azithromycin.     Bijan (Jhonathan Coats  Transitions of Care Pharmacist  Available on [employer] Teams (Preferred)  Spectra: [phone]     Performed medication reconciliation and home medication list updated in prescription writer/outpatient medication review. Medications verified with patient and pharmacy.     Home Pharmacy: [facility]  Allergies: Sulfa drugs (hives)    Home Medications:  atenolol 25 mg oral tablet: 1 tab(s) orally once a day  atorvastatin 10 mg oral tablet: 1 tab(s) orally once a day  cholecalciferol 25 mcg (1000 intl units) oral tablet: 1 tab(s) orally once a day  losartan 25 mg oral tablet: 1 tab(s) orally once a day  losartan 50 mg oral tablet: 1 tab(s) orally once a day    Patient also taking an OTC probiotic, Calcium Citrate and Coenzyme Q10, but unsure if it is 100 mg or 200mg orally once daily. She was previously on ipratropium nasal spray, zofran ODT 4 mg and Augmentin 875-125 mg prescribed outpatient for infection.    MEDICATIONS  (STANDING):  atorvastatin 10 milliGRAM(s) Oral at bedtime  azithromycin   Tablet 500 milliGRAM(s) Oral daily  cefTRIAXone   IVPB 1000 milliGRAM(s) IV Intermittent every 24 hours  lactobacillus acidophilus 1 Tablet(s) Oral daily  sodium chloride 3 Gram(s) Oral three times a day    Recommendations:   Missing her home blood pressure medications, changed losartan 25 mg to 50 mg on home med rec. Recommend adding on losartan 50 mg orally once daily and atenolol 25 mg orally once daily when appropriate.  Patient is being treated empirically with azithromycin for pneumonia. Legionella Antigen, Urine culture returned and result is negative. Recommend to discontinue Azithromycin.     Bijan (Jhonathan Coats  Transitions of Care Pharmacist  Available on [employer] Teams (Preferred)  Spectra: [phone]

## 2024-05-16 NOTE — PHARMACOTHERAPY INTERVENTION NOTE - INTERVENTION TYPE MED REC
Med Rec - Admission
I have reviewed the history and the physical examination of the patient, as well as the assessment and plan, as was entered by the resident physician or the mid-level provider. I agree with the plan to induce or augment labor, and in my opinion, at this time, the use of Pitocin, and/or other induction agent(s), is safe and beneficial to mother and fetus.

## 2024-05-16 NOTE — PROGRESS NOTE ADULT - PROBLEM SELECTOR PLAN 4
Hold losartan and atenolol iso infection Mild transaminitis  Etiology: drug induced vs MASLD    Plan:  -ctm  -hold atorvastatin  -if persistent, can consider RUQ US

## 2024-05-16 NOTE — PROGRESS NOTE ADULT - SUBJECTIVE AND OBJECTIVE BOX
DATE OF SERVICE: 05-16-24 @ 12:15    Patient is a 59y old  Female who presents with a chief complaint of     SUBJECTIVE / OVERNIGHT EVENTS:    MEDICATIONS  (STANDING):  atorvastatin 10 milliGRAM(s) Oral at bedtime  azithromycin   Tablet 500 milliGRAM(s) Oral daily  cefTRIAXone   IVPB 1000 milliGRAM(s) IV Intermittent every 24 hours  chlorhexidine 2% Cloths 1 Application(s) Topical daily  lactobacillus acidophilus 1 Tablet(s) Oral daily  sodium chloride 3 Gram(s) Oral three times a day    MEDICATIONS  (PRN):  albuterol/ipratropium for Nebulization 3 milliLiter(s) Nebulizer every 6 hours PRN Shortness of Breath and/or Wheezing  ondansetron   Disintegrating Tablet 4 milliGRAM(s) Oral every 8 hours PRN Nausea and/or Vomiting      Vital Signs Last 24 Hrs  T(C): 37 (16 May 2024 12:00), Max: 38.9 (15 May 2024 15:50)  T(F): 98.6 (16 May 2024 12:00), Max: 102.1 (15 May 2024 15:50)  HR: 102 (16 May 2024 12:00) (78 - 118)  BP: 131/82 (16 May 2024 12:00) (131/82 - 183/94)  BP(mean): --  RR: 18 (16 May 2024 12:00) (18 - 28)  SpO2: 95% (16 May 2024 12:00) (91% - 96%)    Parameters below as of 16 May 2024 12:00  Patient On (Oxygen Delivery Method): nasal cannula  O2 Flow (L/min): 4    CAPILLARY BLOOD GLUCOSE        I&O's Summary      PHYSICAL EXAM:  GENERAL: NAD, well-developed  HEAD:  Atraumatic, Normocephalic  EYES: EOMI, PERRLA, conjunctiva and sclera clear  NECK: Supple, No JVD  CHEST/LUNG: Clear to auscultation bilaterally; No wheeze  HEART: Regular rate and rhythm; No murmurs, rubs, or gallops  ABDOMEN: Soft, Nontender, Nondistended; Bowel sounds present  EXTREMITIES:  2+ Peripheral Pulses, No clubbing, cyanosis, or edema  PSYCH: AAOx3  NEUROLOGY: non-focal  SKIN: No rashes or lesions    LABS:                        12.1   23.02 )-----------( 393      ( 16 May 2024 07:01 )             35.6     05-16    122<L>  |  86<L>  |  6<L>  ----------------------------<  86  3.7   |  23  |  0.42<L>    Ca    8.5      16 May 2024 07:01  Phos  3.0     05-16  Mg     2.1     05-16    TPro  6.6  /  Alb  3.1<L>  /  TBili  0.6  /  DBili  x   /  AST  52<H>  /  ALT  69<H>  /  AlkPhos  105  05-16    PT/INR - ( 15 May 2024 11:54 )   PT: 13.8 sec;   INR: 1.26 ratio         PTT - ( 15 May 2024 11:54 )  PTT:28.4 sec      Urinalysis Basic - ( 16 May 2024 07:01 )    Color: x / Appearance: x / SG: x / pH: x  Gluc: 86 mg/dL / Ketone: x  / Bili: x / Urobili: x   Blood: x / Protein: x / Nitrite: x   Leuk Esterase: x / RBC: x / WBC x   Sq Epi: x / Non Sq Epi: x / Bacteria: x        RADIOLOGY & ADDITIONAL TESTS:    Imaging Personally Reviewed:    Consultant(s) Notes Reviewed:      Care Discussed with Consultants/Other Providers:   DATE OF SERVICE: 05-16-24 @ 12:15    Patient is a 59y old  Female who presents with a chief complaint of     SUBJECTIVE / OVERNIGHT EVENTS: NAEO. Breathing has been a little more comfortable. No significant pain. Has 2 loose bowel movements.    MEDICATIONS  (STANDING):  atorvastatin 10 milliGRAM(s) Oral at bedtime  azithromycin   Tablet 500 milliGRAM(s) Oral daily  cefTRIAXone   IVPB 1000 milliGRAM(s) IV Intermittent every 24 hours  chlorhexidine 2% Cloths 1 Application(s) Topical daily  lactobacillus acidophilus 1 Tablet(s) Oral daily  sodium chloride 3 Gram(s) Oral three times a day    MEDICATIONS  (PRN):  albuterol/ipratropium for Nebulization 3 milliLiter(s) Nebulizer every 6 hours PRN Shortness of Breath and/or Wheezing  ondansetron   Disintegrating Tablet 4 milliGRAM(s) Oral every 8 hours PRN Nausea and/or Vomiting      Vital Signs Last 24 Hrs  T(C): 37 (16 May 2024 12:00), Max: 38.9 (15 May 2024 15:50)  T(F): 98.6 (16 May 2024 12:00), Max: 102.1 (15 May 2024 15:50)  HR: 102 (16 May 2024 12:00) (78 - 118)  BP: 131/82 (16 May 2024 12:00) (131/82 - 183/94)  BP(mean): --  RR: 18 (16 May 2024 12:00) (18 - 28)  SpO2: 95% (16 May 2024 12:00) (91% - 96%)    Parameters below as of 16 May 2024 12:00  Patient On (Oxygen Delivery Method): nasal cannula  O2 Flow (L/min): 4    CAPILLARY BLOOD GLUCOSE        I&O's Summary      PHYSICAL EXAM:  GENERAL: NAD, lying in bed comfortably  HEAD:  Atraumatic, Normocephalic  EYES: EOMI,  conjunctiva and sclera clear  CHEST/LUNG: increased mild diffuse expiratory wheezes, no crackles   HEART: Regular rate and rhythm; No murmurs, rubs, or gallops  ABDOMEN: BSx4; Soft, nontender, nondistended  EXTREMITIES:  2+ Peripheral Pulses, brisk capillary refill. No cyanosis or edema  NERVOUS SYSTEM:  A&Ox3, no focal deficits   SKIN: No rashes or lesions    LABS:                        12.1   23.02 )-----------( 393      ( 16 May 2024 07:01 )             35.6     05-16    122<L>  |  86<L>  |  6<L>  ----------------------------<  86  3.7   |  23  |  0.42<L>    Ca    8.5      16 May 2024 07:01  Phos  3.0     05-16  Mg     2.1     05-16    TPro  6.6  /  Alb  3.1<L>  /  TBili  0.6  /  DBili  x   /  AST  52<H>  /  ALT  69<H>  /  AlkPhos  105  05-16    PT/INR - ( 15 May 2024 11:54 )   PT: 13.8 sec;   INR: 1.26 ratio         PTT - ( 15 May 2024 11:54 )  PTT:28.4 sec      Urinalysis Basic - ( 16 May 2024 07:01 )    Color: x / Appearance: x / SG: x / pH: x  Gluc: 86 mg/dL / Ketone: x  / Bili: x / Urobili: x   Blood: x / Protein: x / Nitrite: x   Leuk Esterase: x / RBC: x / WBC x   Sq Epi: x / Non Sq Epi: x / Bacteria: x        RADIOLOGY & ADDITIONAL TESTS:    Imaging Personally Reviewed:    Consultant(s) Notes Reviewed:      Care Discussed with Consultants/Other Providers:

## 2024-05-16 NOTE — DIETITIAN INITIAL EVALUATION ADULT - PROBLEM SELECTOR PLAN 3
Mild transaminitis  Etiology: drug induced vs MASLD    Plan:  -ctm  -hold atorvastatin  -if persistent, can consider RUQ US

## 2024-05-16 NOTE — DIETITIAN INITIAL EVALUATION ADULT - NSFNSPHYEXAMSKINFT_GEN_A_CORE
Per flowsheet: Pressure Injury 1: sacrum , Suspected deep tissue injury  Per wound care specialist: none noted

## 2024-05-16 NOTE — DIETITIAN INITIAL EVALUATION ADULT - ADD RECOMMEND
1) Continue regular diet free of therapeutic restrictions, defer diet texture to team, fluid restriction per team  2) Add multivitamin daily for micronutrient coverage pending no medical contraindications  3) Monitor nutritional intake, diet tolerance, labs, hydration, GI function, skin integrity and wt trends   4) Malnutrition sticker placed in chart

## 2024-05-16 NOTE — DIETITIAN INITIAL EVALUATION ADULT - REASON INDICATOR FOR ASSESSMENT
Consult indicated by pressure injury of 2 or >  Sources: Medical Record, pt  Chart reviewed, events noted.

## 2024-05-16 NOTE — DIETITIAN INITIAL EVALUATION ADULT - NSFNSGIIOFT_GEN_A_CORE
I&O's Detail    16 May 2024 07:01  -  16 May 2024 14:53  --------------------------------------------------------  IN:    Oral Fluid: 480 mL  Total IN: 480 mL    OUT:    Voided (mL): 700 mL  Total OUT: 700 mL    Total NET: -220 mL

## 2024-05-16 NOTE — DIETITIAN INITIAL EVALUATION ADULT - NS FNS REASON FOR WEIGHT CHANG
Pt reports UBW of 168 pounds and endorses wt loss, believes to be ~5 pounds. Reports recent wt from urgent care of 165 pounds with clothes/jacket/shoes on.    Current dosing wt 155 pounds (5/15)  No daily wts in chart to assess.     3% wt loss x 2 weeks reported, clinically significant. RD to continue to monitor wt as able   IBW: 100 pounds/decreased po intake

## 2024-05-16 NOTE — PROGRESS NOTE ADULT - PROBLEM SELECTOR PLAN 2
Likely in setting of infection. 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

## 2024-05-16 NOTE — DIETITIAN INITIAL EVALUATION ADULT - PROBLEM SELECTOR PLAN 2
Likely in setting of infection. 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

## 2024-05-16 NOTE — DIETITIAN INITIAL EVALUATION ADULT - OTHER INFO
- Pt with sepsis w/ acute hypoxemic respiratory failure likely in setting of pneumonia, now ordered for IV antibiotics  - Noted hyponatremic, possibly in setting of SIADH secondary to legionella infection, currently ordered for 1.5L fluid restriction and NaCl tabs  - Wound care 5/16 identified no pressure injuries despite note of suspected deep tissue injury per flowsheet

## 2024-05-16 NOTE — DIETITIAN INITIAL EVALUATION ADULT - ENERGY INTAKE
Reports intake improving slightly, though still not close to baseline. Amenable to oral protein supplements. Obtained food preferences, will honor as able. Described and instructed pt on [facility] menu ordering process./Poor (<50%)

## 2024-05-16 NOTE — DIETITIAN INITIAL EVALUATION ADULT - NSFNSGIASSESSMENTFT_GEN_A_CORE
Pt reports persistent nausea and diarrhea, denies vomiting. Last BM 5/16 x3 thus far. Not currently ordered for a bowel regimen.   - Ordered for a probiotic in setting of antibiotics use; Ordered for zofran

## 2024-05-16 NOTE — DIETITIAN INITIAL EVALUATION ADULT - ORAL INTAKE PTA/DIET HISTORY
PTA per pt  -Intake: pt with good typical intake, but reports a severe drop in intake x10 days ago when she developed a fever, afterwards was eating 1 egg and toast/day; does not follow any therapeutic diets at home   -Chewing/Swallowing: denies hx of difficulty   -Allergies/Intolerances: confirms NKFA   -Vitamins/Supplements: none reported per home medications

## 2024-05-16 NOTE — PROGRESS NOTE ADULT - PROBLEM SELECTOR PLAN 3
Mild transaminitis  Etiology: drug induced vs MASLD    Plan:  -ctm  -hold atorvastatin  -if persistent, can consider RUQ US Diarrhea for the past 10 days when fever begun and before abx    Plan:  -encourage patient to eat and have electrolyte fluids  -if over 3 watery bowel movements, have C Diff specimen sent

## 2024-05-16 NOTE — CONSULT NOTE ADULT - ASSESSMENT
A/P: 59F with HTN who present to the hospital for 10 days of fever and dyspnea per request by her PCP. The fever and chills happened first and then was accompanied by a productive cough, congestion, nausea and diarrhea and poor appetite. As the days went on, she became dyspneic, especially on exertion    Wound Consult requested to assist w/ management of  Pressure injury prevention    Moisturize intact skin w/ SWEEN cream BID  Nutrition Consult for optimization in pt w/ Increased nutritional needs            encourage high quality protein, eyal/ prosource, MVI & Vit C to promote wound healing  Continue turning and positioning w/ offloading assistive devices as per protocol  Buttocks/ Sacrum Roseline/  BID and prn soiling        Continue w/ attends under pads and Pericare as per protocol  Waffle Cushion to chair when oob to chair  Continue w/ low air loss pressure redistribution bed surface     Care as per medicine remain available as requested  If needed upon discharge f/u as outpatient at [facility] [address] [place] [address] [phone]  Seen and D/w team   Thank you for this consult,  SOTERO BarakatBC, [facility]  [phone]  Nights/ Weekends/ Holidays please call:  General Surgery Consult pager ([phone]) for emergencies  Wound PT for multilayer leg wrapping or VAC issues ([phone])

## 2024-05-16 NOTE — PROGRESS NOTE ADULT - PROBLEM SELECTOR PLAN 1
On 4L NC, productive cough  CURB65 0, PSI: 48 points -> grade 2  Wells score 1, BNP and trops unremarkable  Etiology: Acute Bacterial Pneumonia (legionella high possibility) vs PE. Less common AIP. Low likelihood of cardiac etiology    Plan:  -ceftriaxone 1 g IV for 5 day course  -azithromycin for 3 day course  -duoneb 3g q6h PRN for dyspnea  -if no resolution or worsening of symptoms consider CTPE  -f/u MRSA, urine legionella  -f/u blood cultures, UA On 4L NC, productive cough  CURB65 0, PSI: 48 points -> grade 2  Wells score 1, BNP and trops unremarkable  Etiology: Acute Bacterial Pneumonia (legionella high possibility) vs PE. Less common AIP. Low likelihood of cardiac etiology  MRSA, urine legionella negative    Plan:  -ceftriaxone 1 g IV for 5 day course  -azithromycin 500 mg for 7 day course  -duoneb 3g q6h PRN for dyspnea  -if no resolution or worsening of symptoms consider CT Chest  -f/u blood cultures

## 2024-05-16 NOTE — DIETITIAN INITIAL EVALUATION ADULT - PERTINENT MEDS FT
MEDICATIONS  (STANDING):  atorvastatin 10 milliGRAM(s) Oral at bedtime  azithromycin   Tablet 500 milliGRAM(s) Oral daily  cefTRIAXone   IVPB 1000 milliGRAM(s) IV Intermittent every 24 hours  chlorhexidine 2% Cloths 1 Application(s) Topical daily  lactobacillus acidophilus 1 Tablet(s) Oral daily  sodium chloride 3 Gram(s) Oral three times a day    MEDICATIONS  (PRN):  albuterol/ipratropium for Nebulization 3 milliLiter(s) Nebulizer every 6 hours PRN Shortness of Breath and/or Wheezing  ondansetron   Disintegrating Tablet 4 milliGRAM(s) Oral every 8 hours PRN Nausea and/or Vomiting

## 2024-05-16 NOTE — PATIENT PROFILE ADULT - HOME ACCESSIBILITY CONCERNS
GI Inpatient Consult Note    Chief Complaint  Vomiting and abdominal pain    History Of Present Illness  Raj is a 80year old male with a past medical history of A. fib on Eliquis, GERD, COPD, Billroth I gastroduodenostomy, heroin abuse/methadone dependence, hypertension, cholecystectomy complicated by choledocholithiasis status post ERCP with sphincterotomy and stone removal with stent placement 3/2020 presents to the hospital with abdominal pain and vomiting. GI consulted for ileus. Patient states on Sunday he woke up vomiting. After vomiting he started having left upper quadrant pain. He states he vomited about 6 times at home and then a few more times when he got to the hospital.  Denies any melena, hematochezia or hematemesis. Denies any NSAID use. Patient states he takes his methadone as prescribed. Patient still smokes marijuana daily. He states sometimes his symptoms get better with hot showers. Patient states his last bowel movement was on Saturday. CBC normal.  AST 23, ALT 20, alk phos 172, total bilirubin 0.6. Lipase normal.  Troponin negative. CT abdomen and pelvis 5/25 showed findings most compatible with mild ileus distal small bowel and there is prominent distention of the rectum with feces and air which may be compatible with fecal impaction. Stable 2.5 cm subtle hypodense lesion inferior aspect lateral portion right lobe of the liver. Nonspecific mild fullness of the prostate gland remains unchanged. Evidence of cholecystectomy and prior surgery epigastric region. Endoscopy History:  EGD 3/31/2022 by Dr. Ave Monzon for hematemesis and abdominal pain showed a normal esophagus. Patent Billroth I gastroduodenostomy was found, characterized by healthy appearing mucosa. Normal duodenal bulb, first portion of the duodenum and second portion of the duodenum. Normal EGD w/ evidence of healthy Billroth I anastomosis.     Pathologic Diagnosis   Esophageal Z-line; biopsy:   -Benign esophageal squamous and glandular mucosa with mild esophagitis and reactive mucosal change; negative for intestinal metaplasia. EGD 2/9/2021 by Dr. Ramo Groves for surveillance of malignancy for history of Harding's esophagus showed nodule found in the esophagus. Biopsied. Billroth I gastroduodenostomy was found, characterized by healthy appearing mucosa. Normal examined duodenum. Pathologic Diagnosis   A. Esophagus biopsy:  Â   Fragments of squamous mucosa with superficial, focal ulceration with acute inflammation and basal hyperplasia. No intraepithelial eosinophils or Harding's esophagus seen. ERCP 3/20/2020 with Dr. Yanci Steve for dilated bile duct with stones showed biliary sphincterotomy, pancreatic and biliary stent placement, extraction of common bile duct stones. Â   EGD 5/30/2015 by Dr. Alli Rush for hematemesis and weight loss showed normal esophagus. Gastritis. Normal duodenum. Â   Colonoscopy 6/1/2015 by Dr. Alli Rush for iron deficiency anemia showed a few 2 to 3 mm polyps at 12 cm proximal to the anus. Resected andÂ retrieved. One 2 mm polyp at 20 cm proximal to the anus. Resected and retrieved. One 2 mm polyp at 12 cm proximal to the anus. Resected and retrieved. Mild diverticulosis in the sigmoid colon and in the descending colon. There was no evidence of diverticular bleeding. Â   Pathologic Diagnosis :     A. Colon; polypectomy at 12 cm:   - Â Consistent with hyperplastic polyp. B. Colon; polypectomy at 20 cm:   - Â Consistent with hyperplastic polyp. C. Colon; polypectomy at 12 cm:   - Â Consistent with hyperplastic polyp.     Past Medical History  Past Medical History:   Diagnosis Date   â¢ A-fib (CMS/HCC)    â¢ BPH (benign prostatic hyperplasia)    â¢ Chronic insomnia    â¢ COPD (chronic obstructive pulmonary disease) (CMS/HCC)    â¢ Depression    â¢ Emphysema, unspecified (CMS/HCC)    â¢ Essential (primary) hypertension    â¢ Gastroesophageal reflux disease    â¢ Heroin abuse (CMS/HCC)    â¢ History of esophagogastroduodenoscopy (EGD) 02/09/2021    Repeat EGD in 3 Years. (02/09/2024)   â¢ History of home oxygen therapy    â¢ HTN (hypertension)    â¢ Hx of hepatitis C     s/p treatment in 7/2019   â¢ Methadone dependence (CMS/HCC)    â¢ Underweight         Surgical History  Past Surgical History:   Procedure Laterality Date   â¢ Abdomen surgery     â¢ Anes ercp w place endoscopic stent in [place] pancreatic oscar  03/20/2020    Dr. Jorge Villalpando   â¢ Cholecystectomy     â¢ Ercp w/ sphicterotomy  03/20/2020    Dr. Jorge Villalpando   â¢ Gallbladder surgery     â¢ Gastrectomy     â¢ Service to gastroenterology     â¢ Stomach surgery       Previous abdominal surgeries: Billroth I gastroduodenostomy, cholecystectomy    IR guided liver biopsy 8/3/21  Â   Cytologic Interpretation   Liver, right lobe, mass; FNA, cell block, and core biopsies:   -Atypia, no definitive evidence of malignancy is present. Extensive necrosis with rare groups of atypical hepatocytes, see comment       Social History  Social History     Tobacco Use   â¢ Smoking status: Current Every Day Smoker     Packs/day: 0.50     Types: Cigarettes   â¢ Smokeless tobacco: Never Used   Vaping Use   â¢ Vaping Use: never used   Substance Use Topics   â¢ Alcohol use: Not Currently   â¢ Drug use: Not Currently     Types: Heroin, Opioids, Marijuana     Comment: [facility]      ETOH use: Denies any alcohol use  Smoking use: Smokes 1/2 pack of cigarettes a day  Illicit Drug use: Smokes marijuana daily, on methadone   Â     Family History  Family History   Problem Relation Age of Onset   â¢ Stroke Mother    â¢ Hypertension Mother    â¢ Stroke Sister    â¢ Hypertension Sister        Family history of GI malignancy: Brother had liver cancer    Review of Systems  Review of Systems   Constitutional: Negative. Negative for chills, fatigue, fever and unexpected weight change. HENT: Negative. Negative for trouble swallowing. Eyes: Negative. Negative for pain. Respiratory: Negative. Negative for shortness of breath. Cardiovascular: Negative. Negative for chest pain. Gastrointestinal: Positive for abdominal pain, nausea and vomiting. Negative for abdominal distention, blood in stool, constipation and diarrhea. Endocrine: Negative. Genitourinary: Negative. Musculoskeletal: Negative for arthralgias. Skin: Negative. Negative for rash. Allergic/Immunologic: Negative. Neurological: Negative. Negative for dizziness, weakness and light-headedness. Hematological: Negative. Psychiatric/Behavioral: Negative. Allergies  ALLERGIES:  Patient has no known allergies. Medications  Medications Prior to Admission   Medication Sig Dispense Refill   â¢ apixaBAN (Eliquis) 2.5 MG Tab Take 1 tablet by mouth 2 times daily. 180 tablet 3   â¢ albuterol 108 (90 Base) MCG/ACT inhaler Inhale 2 puffs into the lungs every 4 hours as needed for Shortness of Breath or Wheezing. 1 each 3   â¢ methadone (DOLOPHINE) 10 MG/ML solution 60 mg daily. 25mg po daily per [facility]-- (DI Tel# [phone]) 1 Bottle 0   â¢ amLODIPine (NORVASC) 5 MG tablet Take 1 tablet by mouth daily. 90 tablet 3   â¢ pantoprazole (PROTONIX) 40 MG tablet Take 1 tablet by mouth daily. . 90 tablet 1   â¢ mirtazapine (REMERON) 30 MG tablet 1 tab once daily for with dinner for sleep- increase from 15mg on 9/1/21- do NOT take with any med that makes you sleepy 90 tablet 0   â¢ acetaminophen (TYLENOL) 325 MG tablet Take 2 tablets by mouth every 4 hours as needed for Pain. â¢ naLOXone (NARCAN) 4 MG/0.1ML nasal spray Spray the content of 1 device into 1 nostril. Call 911. May repeat with 2nd device in alternate nostril if no response in 2-3 minutes.  2 each 1   â¢ Anoro Ellipta 62.5-25 MCG/INH inhaler Inhale 1 puff once daily 60 each 2   â¢ carbamide peroxide (DEBROX) 6.5 % otic solution 5-10 drops in affected ears twice daily for 4 days to remove wax 15 mL 0   â¢ buPROPion (WELLBUTRIN SR) 150 MG 12 hr tablet      â¢ fluticasone (FLOVENT HFA) 220 MCG/ACT inhaler Inhale 1 puff into the lungs 2 times daily.  12 g 3       Current Facility-Administered Medications   Medication Dose Route Frequency Provider Last Rate Last Admin   â¢ cefTRIAXone (ROCEPHIN) syringe 1,000 mg  1,000 mg Intravenous Daily Karlos Heller MD   1,000 mg at 05/25/22 0430   â¢ metoPROLOL (LOPRESSOR) injection 5 mg  5 mg Intravenous 4 times per day Sarah Roque MD   5 mg at 05/25/22 0435   â¢ potassium CHLORIDE 20 mEq/100mL IVPB premix  20 mEq Intravenous Once Karlos Heller MD        Followed by   [facility] potassium CHLORIDE 20 mEq/100mL IVPB premix  20 mEq Intravenous Once Karlos Heller MD       â¢ polyethylene glycol (MIRALAX) packet 17 g  17 g Oral BID Viamarti Heller MD   17 g at 05/25/22 0003   â¢ docusate sodium-sennosides (SENOKOT S) 50-8.6 MG 2 tablet  2 tablet Oral Nightly Viamarti Heller MD   2 tablet at 05/24/22 2213   â¢ bisacodyl (DULCOLAX) suppository 10 mg  10 mg Rectal Once Karlos Heller MD       â¢ guaiFENesin-DM) (ROBITUSSIN DM) 100-10 MG/5ML syrup 10 mL  10 mL Oral Q4H PRN Karlos Heller MD       â¢ hydrALAZINE (APRESOLINE) tablet 25 mg  25 mg Oral Q4H PRN Karlos Heller MD   25 mg at 05/25/22 0003   â¢ melatonin tablet 3 mg  3 mg Oral QHS PRN Karlos Heller MD       â¢ Potassium Standard Replacement Protocol   Does not apply See Admin Instructions Karlos Heller MD       â¢ Magnesium Standard Replacement Protocol   Does not apply See Admin Instructions Karlos Heller MD       â¢ Phosphorus Standard Replacement Protocol   Does not apply See Admin Instructions Karlos Heller MD       â¢ ondansetron [facility]) injection 4 mg  4 mg Intravenous BID PRN Karlso Heller MD   4 mg at 05/25/22 3290   â¢ prochlorperazine (COMPAZINE) tablet 5 mg  5 mg Oral Q4H PRN Karlos Heller MD       â¢ prochlorperazine (COMPAZINE) injection 5 mg  5 mg Intravenous Q4H PRN Karlos Heller MD       â¢ acetaminophen (TYLENOL) tablet 650 mg  650 mg Oral Q4H PRN Karlos Heller MD â¢ HYDROcodone-acetaminophen (NORCO) 5-325 MG per tablet 1 tablet  1 tablet Oral Q4H PRN He Diego MD       â¢ polyethylene glycol (MIRALAX) packet 17 g  17 g Oral Daily PRN He Diego MD       â¢ docusate sodium-sennosides (SENOKOT S) 50-8.6 MG 2 tablet  2 tablet Oral Daily PRN He Diego MD       â¢ bisacodyl (DULCOLAX) suppository 10 mg  10 mg Rectal Daily PRN He Diego MD       â¢ magnesium hydroxide (MILK OF MAGNESIA) 400 MG/5ML suspension 30 mL  30 mL Oral Daily PRN He Diego MD       â¢ aluminum-magnesium hydroxide-simethicone (MAALOX) 412-716-41 MG/5ML suspension 30 mL  30 mL Oral Q4H PRN He Diego MD       â¢ albuterol inhaler 2 puff  2 puff Inhalation Q4H PRN He Diego MD       â¢ amLODIPine (NORVASC) tablet 5 mg  5 mg Oral Daily He Diego MD       â¢ umeclidinium-vilanterol (ANORO ELLIPTA) 62.5-25 MCG/INH inhaler 1 puff  1 puff Inhalation Daily Resp He Diego MD       â¢ apixaBAN Earl Grumbling) tablet 2.5 mg  2.5 mg Oral BID He Diego MD   2.5 mg at 05/25/22 0003   â¢ buPROPion (WELLBUTRIN SR) SR tablet 150 mg  150 mg Oral 2 times per day He Diego MD   150 mg at 05/25/22 0003   â¢ fluticasone propionate (FLOVENT HFA) 110 MCG/ACT inhaler 2 puff  2 puff Inhalation BID Resp He Diego MD       â¢ methaDONE (DOLOPHINE) oral solution 60 mg  60 mg Oral Daily He Diego MD       â¢ mirtazapine (REMERON) tablet 15 mg  15 mg Oral Nightly He Diego MD   15 mg at 05/25/22 0003   â¢ pantoprazole (PROTONIX) EC tablet 40 mg  40 mg Oral Daily He Diego MD            Physical Exam  Physical Exam  Vitals and nursing note reviewed. Constitutional:       General: He is not in acute distress. Appearance: Normal appearance. HENT:      Head: Normocephalic and atraumatic. Neck: Normal range of motion and neck supple. Eyes:      Conjunctiva/sclera: Conjunctivae normal.   Cardiovascular:      Rate and Rhythm: Normal rate.    Pulmonary: Effort: Pulmonary effort is normal. No respiratory distress. Abdominal:      General: Bowel sounds are normal. There is no distension. Palpations: Abdomen is soft. There is no mass. Tenderness: There is no abdominal tenderness. There is no guarding. Musculoskeletal:         General: Normal range of motion. Right lower leg: No edema. Left lower leg: No edema. Skin:     General: Skin is warm and dry. Neurological:      Mental Status: He is alert and oriented to person, place, and time. Psychiatric:         Mood and Affect: Mood normal.         Behavior: Behavior normal.          Last Recorded Vitals  Blood pressure (!) 149/99, pulse 79, temperature 97.3 Â°F (36.3 Â°C), temperature source Axillary, resp. rate 20, height 6' (1.829 m), weight 59.3 kg (130 lb 11.7 oz), SpO2 94 %. Body mass index is 17.73 kg/mÂ².      Relevant Results      Lab Results   Component Value Date    WBC 11.5 (H) 05/25/2022    HGB 16.8 05/25/2022    HCT 49.3 05/25/2022     05/25/2022    MCV 81.9 05/25/2022       Lab Results   Component Value Date    AST 23 05/25/2022    GPT 24 05/25/2022    ALKPT 166 (H) 05/25/2022    ALBUMIN 4.0 05/25/2022    BILIRUBIN 0.5 05/25/2022    DBIL 0.1 07/27/2020    INR 1.0 08/03/2021     Lab Results   Component Value Date    ALLIVP 67.9 02/19/2020    ALKLF 169.8 (H) 02/19/2020    ALBONP 32.1 02/19/2020       Lab Results   Component Value Date    HBAG NEGATIVE 01/22/2020    HSAB POSITIVE (A) 01/22/2020    HCAB POSITIVE (A) 01/22/2020    HBINT  01/22/2020     HEPATITIS B, PREVIOUS INFECTION OR CONVALESCENT STATE OR LATENT INFECTION    HCV POSITIVE (A) 01/22/2020    HCVMOL NOT DETECTED 01/22/2020    HCVLOG NOT DETECTED 01/22/2020     Lab Results   Component Value Date    SMA POSITIVE (A) 02/19/2020     01/22/2020    A1APGA MITRAL VALVE 01/22/2020    AFET <3 07/25/2021    IRON 39 (L) 01/22/2020    PST 16 01/22/2020    [facility] 245 (L) 01/22/2020       Lab Results   Component Value Date    ANGE 44 05/31/2015    LIPA <50 (L) 05/24/2022       Lab Results   Component Value Date    SODIUM 135 05/25/2022    BUN 17 05/25/2022    CREATININE 0.77 05/25/2022       Lab Results   Component Value Date    TSH 0.883 01/22/2020       Imaging:    CT ABDOMEN PELVIS WO CONTRAST  Narrative: EXAM: CT ABDOMEN PELVIS WO CONTRAST    CLINICAL INDICATION: Abdominal pain. Intractable nausea. Emesis. COMPARISON:  CT abdomen and pelvis with IV contrast 03/30/2022. TECHNIQUE: Multislice helical CT through the abdomen and pelvis performed  without IV contrast and following oral contrast.    FINDINGS: There is mild distention of GI contrast filled and air-filled  small bowel and there is GI contrast within the right side of the colon. There is air and feces within the rectum and the colon. There is moderate  distention of the rectum. Appendix images 36 through 53 at 601B appears  within normal limits. Surgical clips within the epigastric region. Surgical clips gallbladder  fossa. Gallbladder is not identified. There is a 1.4 cm hypodense lesion parapelvic area midpole right kidney and  a 1 cm hypodense lesion within the posterior medial aspect midpole left  kidney which remain unchanged when compared to the previous examination. Allowing for lack of IV contrast, remainder examination of the left and  right kidney is unremarkable. There is a 2.5 cm hypodense lesion lateral aspect lower portion right lobe  of the liver which remains unchanged. Allowing for lack of IV contrast,  remainder of examination of the liver and examination of the spleen,  adrenal glands and pancreas demonstrate no significant abnormality. No evidence of free fluid or free air within the peritoneum. Mural calcification abdominal aorta and iliac vessels. Lung bases are clear. There is mild fullness of the prostate gland which remains unchanged. Mild diffuse osteopenia.   Facet degenerative change L5-S1, L4-L5 and L3-L4.   Chronic mild compression deformity superior endplate L2 remains unchanged. Impression:    1. Findings most compatible with mild ileus distal small bowel and there  is prominent distention of the rectum with feces and air which may be  compatible with impaction. Recommend correlation with clinical assessment. 2.  Stable 2.5 cm subtle hypodense lesion inferior aspect lateral portion  right lobe of the liver. Recommend correlation with clinical assessment  and if additional imaging is warranted, MRI examination liver could be  considered to characterize this finding further. 3.  Nonspecific mild fullness of the prostate gland remains unchanged. 4.  Stable hypodense lesions within the left and right kidney most  compatible with renal cysts. 5.  Atherosclerosis abdominal aorta. 6.  Mild chronic compression deformity superior endplate L2.  7.  Diffuse osteopenia compatible with osteoporosis. 8.  Evidence of cholecystectomy and evidence of prior surgery epigastric  region. 9.  Degenerative spondylosis as described above. Electronically Signed by: Daiana Higuera M.D.    Signed on: 5/24/2022 1:24 PM       Patient Active Problem List   Diagnosis   â¢ A-fib (CMS/HCC)   â¢ Essential (primary) hypertension   â¢ Heroin abuse (CMS/HCC)   â¢ History of home oxygen therapy   â¢ Hx of hepatitis C   â¢ Methadone dependence (CMS/HCC)   â¢ Abdominal pain, acute, epigastric   â¢ Abdominal pain   â¢ Polycythemia   â¢ Hematemesis with nausea   â¢ Sepsis due to pneumonia (CMS/HCC)   â¢ COPD exacerbation (CMS/HCC)   â¢ Hyponatremia   â¢ Aspiration into airway   â¢ Ileus (CMS/HCC)   â¢ Tobacco dependence       Assessment and Plan    Claudia Ferreira is a 80year old male with a past medical history of A. fib on Eliquis, GERD, COPD, Billroth I gastroduodenostomy, heroin abuse/methadone dependence, hypertension, cholecystectomy complicated by choledocholithiasis status post ERCP with sphincterotomy and stone removal with stent placement "3/2020 presents to the hospital with abdominal pain and vomiting. GI consulted for ileus. # Abdominal pain and vomiting  # Mild small bowel ileus noted on imaging  # Constipation/fecal impaction  # Stable liver lesion  # Chronic anticoagulation  # History of Billroth I gastroduodenostomy  # History of heroin abuse/methadone dependence  -- CT abdomen and pelvis 5/25 showed findings most compatible with mild ileus distal small bowel and there is prominent distention of the rectum with feces and air which may be compatible with fecal impaction. Evidence of cholecystectomy and prior surgery epigastric region. Stable 2.5 cm subtle hypodense lesion inferior aspect lateral portion right lobe of the liver. -- Patient states he vomited a couple times this morning.  -- Denies any abdominal pain  -- Denies any melena, hematochezia or hematemesis. -- Has not had a bowel movement since Saturday  -- Hemoglobin 16.8  -- AST 23, ALT 24, alk phos 166, total bilirubin 0.5  -- Lipase normal  -- WBC 11.5. Afebrile. -- Patient recently had an EGD in March which was normal.  -- We will schedule antiemetics; Reglan and Zofran alternating every 6 hours. -- Continue MiraLAX twice daily  -- Continue PPI daily   -- Mag citrate 300 mL x 1 dose  -- Okay for clear liquid diet and advance as tolerated  -- Monitor bowel movements  -- Abstinence from marijuana use is advised    Case discussed with Dr. Carlos Perera NP  [facility]    This note was generated in part using ""Dragon\"" voice recognition technology. The report was reviewed by myself, but still may have unintentional errors due to inherent limitations of voice recognition technology.   " none Estinfa

## 2024-05-16 NOTE — PROGRESS NOTE ADULT - ATTENDING COMMENTS
Patient seen and evaluated. Symptomatically slowly imrpoving, able to speak in full sentences. Cough non-productive still. No fevers since last night.     Exam as above: In addition:  Lymph: No cervical or submandibular LAD  ENT: No tonsillar exudate  Lungs: Diffuse rhonchi    Labs  Na 122  WBC 23    #Sepsis 2/2 multifocal pna  -will treat with ceftriaxone/azithromycin.   -given diarrhea, hyponatremia (2/2 SIADH) will still treat for legnionella despite negative urine (70% sensitivity)  -monitor fever curve  -follow up BCx  -pulmonary toilet      #hyponatremia  -Robyn 80, UOsm 350 consistent with SIADH  -increase salt tabs  -continue fluid restriction  -goal increase 6-8mEq next 24h hours  -BMP q6-8hours. Patient seen and evaluated. Symptomatically slowly imrpoving, able to speak in full sentences. Cough non-productive still. No fevers since last night.     Exam as above: In addition:  Lymph: No cervical or submandibular LAD  ENT: No tonsillar exudate  Lungs: Diffuse rhonchi    Labs  Na 122  WBC 23    #Sepsis 2/2 multifocal pna  -will treat with ceftriaxone/azithromycin.   -given diarrhea, hyponatremia (2/2 SIADH) will still treat for legnionella despite negative urine (70% sensitivity)  -monitor fever curve  -follow up BCx  -pulmonary toilet      #hyponatremia  -Robyn 80, UOsm 350 consistent with SIADH  -increase salt tabs  -continue fluid restriction  -goal increase 6-8mEq next 24h hours  -BMP q6-8hours.    #diarrhea  -3 BM overnight.   -similar to previously, suspect part of pna prodrome and not GI based illness  -will not obtain GI PCR, will not   -if worsens diarrhea, check c-diff. abdomen soft without pain and +BS.

## 2024-05-16 NOTE — DIETITIAN INITIAL EVALUATION ADULT - PERSON TAUGHT/METHOD
Provided education on meeting adequate protein-energy needs, emphasized the importance of adequate calorie and protein intake during times of acute illness. Encouraged prioritizing protein foods and consuming adequate amounts of protein at each meal for preservation of lean muscle mass.   Spoke about foods that may reduce diarrhea and how to optimize eating during times the pt is not nauseous. Made aware RD remains available./verbal instruction/patient instructed

## 2024-05-16 NOTE — DIETITIAN INITIAL EVALUATION ADULT - OTHER CALCULATIONS
Calculations based on dosing wt with consideration for BMI >30, malnutrition   - Fluid needs deferred to team

## 2024-05-17 ENCOUNTER — TRANSCRIPTION ENCOUNTER (OUTPATIENT)
Age: 59
End: 2024-05-17

## 2024-05-17 LAB
ALBUMIN SERPL ELPH-MCNC: 3.1 G/DL — LOW (ref 3.3–5)
ALP SERPL-CCNC: 110 U/L — SIGNIFICANT CHANGE UP (ref 40–120)
ALT FLD-CCNC: 86 U/L — HIGH (ref 10–45)
ANION GAP SERPL CALC-SCNC: 12 MMOL/L — SIGNIFICANT CHANGE UP (ref 5–17)
AST SERPL-CCNC: 57 U/L — HIGH (ref 10–40)
BASOPHILS # BLD AUTO: 0 K/UL — SIGNIFICANT CHANGE UP (ref 0–0.2)
BASOPHILS NFR BLD AUTO: 0 % — SIGNIFICANT CHANGE UP (ref 0–2)
BILIRUB SERPL-MCNC: 0.4 MG/DL — SIGNIFICANT CHANGE UP (ref 0.2–1.2)
BUN SERPL-MCNC: 8 MG/DL — SIGNIFICANT CHANGE UP (ref 7–23)
CALCIUM SERPL-MCNC: 8.4 MG/DL — SIGNIFICANT CHANGE UP (ref 8.4–10.5)
CHLORIDE SERPL-SCNC: 92 MMOL/L — LOW (ref 96–108)
CO2 SERPL-SCNC: 24 MMOL/L — SIGNIFICANT CHANGE UP (ref 22–31)
CREAT SERPL-MCNC: 0.48 MG/DL — LOW (ref 0.5–1.3)
EGFR: 110 ML/MIN/1.73M2 — SIGNIFICANT CHANGE UP
EOSINOPHIL # BLD AUTO: 0.13 K/UL — SIGNIFICANT CHANGE UP (ref 0–0.5)
EOSINOPHIL NFR BLD AUTO: 0.9 % — SIGNIFICANT CHANGE UP (ref 0–6)
GIANT PLATELETS BLD QL SMEAR: PRESENT — SIGNIFICANT CHANGE UP
GLUCOSE SERPL-MCNC: 122 MG/DL — HIGH (ref 70–99)
HCT VFR BLD CALC: 32.9 % — LOW (ref 34.5–45)
HGB BLD-MCNC: 11.7 G/DL — SIGNIFICANT CHANGE UP (ref 11.5–15.5)
LYMPHOCYTES # BLD AUTO: 1.17 K/UL — SIGNIFICANT CHANGE UP (ref 1–3.3)
LYMPHOCYTES # BLD AUTO: 7.9 % — LOW (ref 13–44)
MAGNESIUM SERPL-MCNC: 2.4 MG/DL — SIGNIFICANT CHANGE UP (ref 1.6–2.6)
MANUAL SMEAR VERIFICATION: SIGNIFICANT CHANGE UP
MCHC RBC-ENTMCNC: 31.3 PG — SIGNIFICANT CHANGE UP (ref 27–34)
MCHC RBC-ENTMCNC: 35.6 GM/DL — SIGNIFICANT CHANGE UP (ref 32–36)
MCV RBC AUTO: 88 FL — SIGNIFICANT CHANGE UP (ref 80–100)
MONOCYTES # BLD AUTO: 0.9 K/UL — SIGNIFICANT CHANGE UP (ref 0–0.9)
MONOCYTES NFR BLD AUTO: 6.1 % — SIGNIFICANT CHANGE UP (ref 2–14)
NEUTROPHILS # BLD AUTO: 12.62 K/UL — HIGH (ref 1.8–7.4)
NEUTROPHILS NFR BLD AUTO: 83.3 % — HIGH (ref 43–77)
NEUTS BAND # BLD: 1.8 % — SIGNIFICANT CHANGE UP (ref 0–8)
PHOSPHATE SERPL-MCNC: 3 MG/DL — SIGNIFICANT CHANGE UP (ref 2.5–4.5)
PLAT MORPH BLD: ABNORMAL
PLATELET # BLD AUTO: 452 K/UL — HIGH (ref 150–400)
POTASSIUM SERPL-MCNC: 3.6 MMOL/L — SIGNIFICANT CHANGE UP (ref 3.5–5.3)
POTASSIUM SERPL-SCNC: 3.6 MMOL/L — SIGNIFICANT CHANGE UP (ref 3.5–5.3)
PROT SERPL-MCNC: 6.7 G/DL — SIGNIFICANT CHANGE UP (ref 6–8.3)
RBC # BLD: 3.74 M/UL — LOW (ref 3.8–5.2)
RBC # FLD: 13.2 % — SIGNIFICANT CHANGE UP (ref 10.3–14.5)
RBC BLD AUTO: SIGNIFICANT CHANGE UP
SODIUM SERPL-SCNC: 128 MMOL/L — LOW (ref 135–145)
WBC # BLD: 14.83 K/UL — HIGH (ref 3.8–10.5)
WBC # FLD AUTO: 14.83 K/UL — HIGH (ref 3.8–10.5)

## 2024-05-17 PROCEDURE — 99232 SBSQ HOSP IP/OBS MODERATE 35: CPT | Mod: GC

## 2024-05-17 RX ORDER — ATENOLOL 25 MG/1
25 TABLET ORAL ONCE
Refills: 0 | Status: COMPLETED | OUTPATIENT
Start: 2024-05-17 | End: 2024-05-17

## 2024-05-17 RX ORDER — CEFTRIAXONE 500 MG/1
1000 INJECTION, POWDER, FOR SOLUTION INTRAMUSCULAR; INTRAVENOUS EVERY 24 HOURS
Refills: 0 | Status: COMPLETED | OUTPATIENT
Start: 2024-05-18 | End: 2024-05-19

## 2024-05-17 RX ORDER — POTASSIUM CHLORIDE 20 MEQ
40 PACKET (EA) ORAL ONCE
Refills: 0 | Status: COMPLETED | OUTPATIENT
Start: 2024-05-17 | End: 2024-05-17

## 2024-05-17 RX ADMIN — SODIUM CHLORIDE 2 GRAM(S): 9 INJECTION INTRAMUSCULAR; INTRAVENOUS; SUBCUTANEOUS at 22:31

## 2024-05-17 RX ADMIN — SODIUM CHLORIDE 2 GRAM(S): 9 INJECTION INTRAMUSCULAR; INTRAVENOUS; SUBCUTANEOUS at 15:10

## 2024-05-17 RX ADMIN — Medication 1 TABLET(S): at 12:21

## 2024-05-17 RX ADMIN — LOSARTAN POTASSIUM 25 MILLIGRAM(S): 100 TABLET, FILM COATED ORAL at 05:26

## 2024-05-17 RX ADMIN — AZITHROMYCIN 500 MILLIGRAM(S): 500 TABLET, FILM COATED ORAL at 12:21

## 2024-05-17 RX ADMIN — Medication 3 MILLILITER(S): at 12:21

## 2024-05-17 RX ADMIN — Medication 40 MILLIEQUIVALENT(S): at 08:05

## 2024-05-17 RX ADMIN — ATENOLOL 25 MILLIGRAM(S): 25 TABLET ORAL at 20:03

## 2024-05-17 RX ADMIN — SODIUM CHLORIDE 2 GRAM(S): 9 INJECTION INTRAMUSCULAR; INTRAVENOUS; SUBCUTANEOUS at 05:26

## 2024-05-17 RX ADMIN — Medication 3 MILLILITER(S): at 05:27

## 2024-05-17 RX ADMIN — ENOXAPARIN SODIUM 40 MILLIGRAM(S): 100 INJECTION SUBCUTANEOUS at 18:19

## 2024-05-17 RX ADMIN — ATORVASTATIN CALCIUM 10 MILLIGRAM(S): 80 TABLET, FILM COATED ORAL at 22:31

## 2024-05-17 RX ADMIN — Medication 100 MILLIGRAM(S): at 22:31

## 2024-05-17 RX ADMIN — CEFTRIAXONE 100 MILLIGRAM(S): 500 INJECTION, POWDER, FOR SOLUTION INTRAMUSCULAR; INTRAVENOUS at 05:27

## 2024-05-17 RX ADMIN — CHLORHEXIDINE GLUCONATE 1 APPLICATION(S): 213 SOLUTION TOPICAL at 12:22

## 2024-05-17 RX ADMIN — Medication 3 MILLILITER(S): at 20:03

## 2024-05-17 NOTE — DISCHARGE NOTE PROVIDER - DETAILS OF MALNUTRITION DIAGNOSIS/DIAGNOSES
This patient has been assessed with a concern for Malnutrition and was treated during this hospitalization for the following Nutrition diagnosis/diagnoses:     -  05/20/2024: Severe protein-calorie malnutrition

## 2024-05-17 NOTE — DISCHARGE NOTE PROVIDER - HOSPITAL COURSE
HPI:  Martha is a 59F with HTN who present to the hospital for 10 days of fever and dyspnea per request by her PCP. The fever and chills happened first and then was accompanied by a productive cough, congestion, nausea and diarrhea and poor appetite. As the days went on, she became dyspneic, especially on exertion. She saw urgent care on 05/11 where she got an xray and augmentin. On 05/14, she saw her PCP where she got another xray and bloodwork. Due to abnormal labs, chest xray findings and lack of resolution of symptoms her PCP recommended going to the emergency room for further management.     In the ED, VS noteworthy for RR of mid 20s, 4L NC, tachycardia and temperature of 101.5. The patient received 1.4L of fluid along with ceftriaxone and azithromycin and tylenol and admitted for further management. (15 May 2024 15:45)    Hospital Course:      Important Medication Changes and Reason:    Active or Pending Issues Requiring Follow-up:    Advanced Directives:   [ ] Full code  [ ] DNR  [ ] Hospice    Discharge Diagnoses:         HPI:  Martha is a 59F with HTN who present to the hospital for 10 days of fever and dyspnea per request by her PCP. The fever and chills happened first and then was accompanied by a productive cough, congestion, nausea and diarrhea and poor appetite. As the days went on, she became dyspneic, especially on exertion. She saw urgent care on 05/11 where she got an xray and augmentin. On 05/14, she saw her PCP where she got another xray and bloodwork. Due to abnormal labs, chest xray findings and lack of resolution of symptoms her PCP recommended going to the emergency room for further management.     In the ED, VS noteworthy for RR of mid 20s, 4L NC, tachycardia and temperature of 101.5. The patient received 1.4L of fluid along with ceftriaxone and azithromycin and tylenol and admitted for further management. (15 May 2024 15:45)    Hospital Course:  The xray of the chest showed reticular opacities. With the imaging findings, fevers, cough, high oxygen needs, diarrhea, and low sodium, the patient was treated for pneumonia with concern for legionella. The tests were negative for legionella thus far but due to clinical suspicion she was placed on a full course of treatment. From the antibiotics and nebulizers the infection began to improve along with the breathing and diarrhea. Her initial sodium was 123 and the patient was placed on salt tabs and fluid restriction to help bring her sodium up. On _______, the patient is clinically stable, off oxygen and ready for discharge.    Important Medication Changes and Reason:  Azithromycin for ________    Active or Pending Issues Requiring Follow-up:    Advanced Directives:   [ X] Full code  [ ] DNR  [ ] Hospice    Discharge Diagnoses: Acute bacterial pneumonia         HPI:  Martha is a 59F with HTN who present to the hospital for 10 days of fever and dyspnea per request by her PCP. The fever and chills happened first and then was accompanied by a productive cough, congestion, nausea and diarrhea and poor appetite. As the days went on, she became dyspneic, especially on exertion. She saw urgent care on 05/11 where she got an xray and augmentin. On 05/14, she saw her PCP where she got another xray and bloodwork. Due to abnormal labs, chest xray findings and lack of resolution of symptoms her PCP recommended going to the emergency room for further management.     In the ED, VS noteworthy for RR of mid 20s, 4L NC, tachycardia and temperature of 101.5. The patient received 1.4L of fluid along with ceftriaxone and azithromycin and tylenol and admitted for further management. (15 May 2024 15:45)    Hospital Course:  The xray of the chest showed reticular opacities. With the imaging findings, fevers, cough, high oxygen needs, diarrhea, and low sodium, the patient was treated for pneumonia with concern for legionella. The tests were negative for legionella thus far but due to clinical suspicion she was placed on a full course of treatment including a 7 day course of azithromycin and 5 day course of ceftriaxone. From the antibiotics and nebulizers the infection began to improve along with the breathing and diarrhea. Her initial sodium was 123 and the patient was placed on salt tabs and fluid restriction to help bring her sodium up. She continued to require high amounts of nasal cannula oxygen and a CTPE was ordered which was negative for pulmonary embolism. To help with her airways, she was started on duoneb, incentive spirometer and acapella. Pulmonology was consulted and she received 2 doses of lasix which helped bring down the oxygen. On 05/24, the patient is clinically stable, off oxygen and ready for discharge.    Otherwise she was having some tachycardia and was started on metoprolol instead of her home atenolol due to reduced blood pressure effects.     Important Medication Changes and Reason:  STOP losartan due to hypotension  STOP atenolol and START metoprolol for reduced blood pressure effects    Active or Pending Issues Requiring Follow-up:  PCP: Follow up in one weeks and check a CBC and CMP. Her liver enzymes have been mildly elevated this admission. Needs reintroduction of blood pressure medicines.  Pulmonology - Home: Tele-health follow up within 48 hours of discharge  Cardiology: To address sinus tachycardia and medication management.     Advanced Directives:   [ X] Full code  [ ] DNR  [ ] Hospice    Discharge Diagnoses: Acute bacterial pneumonia         HPI:  Martha is a 59F with HTN who present to the hospital for 10 days of fever and dyspnea per request by her PCP. The fever and chills happened first and then was accompanied by a productive cough, congestion, nausea and diarrhea and poor appetite. As the days went on, she became dyspneic, especially on exertion. She saw urgent care on 05/11 where she got an xray and augmentin. On 05/14, she saw her PCP where she got another xray and bloodwork. Due to abnormal labs, chest xray findings and lack of resolution of symptoms her PCP recommended going to the emergency room for further management.     In the ED, VS noteworthy for RR of mid 20s, 4L NC, tachycardia and temperature of 101.5. The patient received 1.4L of fluid along with ceftriaxone and azithromycin and tylenol and admitted for further management. (15 May 2024 15:45)    Hospital Course:  The xray of the chest showed reticular opacities. With the imaging findings, fevers, cough, high oxygen needs, diarrhea, and low sodium, the patient was treated for pneumonia with concern for legionella. The tests were negative for legionella thus far but due to clinical suspicion she was placed on a full course of treatment including a 7 day course of azithromycin and 5 day course of ceftriaxone. From the antibiotics and nebulizers the infection began to improve along with the breathing and diarrhea. Her initial sodium was 123 and the patient was placed on salt tabs and fluid restriction to help bring her sodium up. She continued to require high amounts of nasal cannula oxygen and a CTPE was ordered which was negative for pulmonary embolism. To help with her airways, she was started on duoneb, incentive spirometer and acapella. Pulmonology was consulted and she received 2 doses of lasix which helped bring down the oxygen. On 05/24, the patient is clinically stable, off oxygen and ready for discharge.    Otherwise she was having some tachycardia and was started on metoprolol instead of her home atenolol due to reduced blood pressure effects.     Important Medication Changes and Reason:  Hold losartan due to soft BP  STOP atenolol and START metoprolol     Active or Pending Issues Requiring Follow-up:  PCP: Follow up in one weeks and check a CBC and CMP. Her liver enzymes have been mildly elevated this admission. Needs reintroduction of blood pressure medicines.  Pulmonology - Home: Tele-health follow up within 48 hours of discharge  Cardiology: To address medication management.     Advanced Directives:   [ X] Full code  [ ] DNR  [ ] Hospice    Discharge Diagnoses: Acute bacterial pneumonia

## 2024-05-17 NOTE — PROGRESS NOTE ADULT - ATTENDING COMMENTS
Patient seen and evaluated. Symptomatically improving, diarrhea resolved,   Exam as above: In addition:  Lymph: No cervical or submandibular LAD  ENT: No tonsillar exudate  Lungs: Diffuse rhonchi    Labs  Na 122  WBC 23    #Sepsis 2/2 multifocal pna  -will treat with ceftriaxone/azithromycin.   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   -send off legionella pcr via sputum.  -add acapella for airway clearance        #hyponatremia  -128 today.   -goal up to 136 tomorrow  -continue sodium chloride 2g TID  -BMP dialy    #diarrhea  -resolved Patient seen and evaluated. Symptomatically improving, diarrhea resolved,   Exam as above: In addition:    Lungs: improving rhonchi. Better air movement.       #Sepsis 2/2 multifocal pna  -will treat with ceftriaxone/azithromycin.   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   -send off legionella pcr via sputum.  -add acapella for airway clearance        #hyponatremia  -128 today.   -goal up to 136 tomorrow  -continue sodium chloride 2g TID  -BMP dialy    #diarrhea  -resolved

## 2024-05-17 NOTE — PROGRESS NOTE ADULT - PROBLEM SELECTOR PLAN 1
On 4L NC, productive cough  CURB65 0, PSI: 48 points -> grade 2  Wells score 1, BNP and trops unremarkable  Etiology: Acute Bacterial Pneumonia (legionella high possibility) vs PE. Less common AIP. Low likelihood of cardiac etiology  MRSA, urine legionella negative    Plan:  -ceftriaxone 1 g IV for 5 day course  -azithromycin 500 mg for 7 day course  -duoneb 3g q6h PRN for dyspnea  -if no resolution or worsening of symptoms consider CT Chest  -f/u blood cultures On 4L NC, productive cough  CURB65 0, PSI: 48 points -> grade 2  Wells score 1, BNP and trops unremarkable  Etiology: Acute Bacterial Pneumonia (legionella high possibility) vs PE. Less common AIP. Low likelihood of cardiac etiology  MRSA, urine legionella negative    Plan:  -ceftriaxone 1 g IV for 5 day course  -azithromycin 500 mg for 7 day course  -duoneb 3g q6h PRN for dyspnea  -wean oxygen as tolerated  -will need ambulatory study before discharge

## 2024-05-17 NOTE — PROGRESS NOTE ADULT - SUBJECTIVE AND OBJECTIVE BOX
DATE OF SERVICE: 05-17-24 @ 07:28    Patient is a 59y old  Female who presents with a chief complaint of Pneumonia due to infectious organism     (16 May 2024 14:44)      SUBJECTIVE / OVERNIGHT EVENTS:    MEDICATIONS  (STANDING):  atorvastatin 10 milliGRAM(s) Oral at bedtime  azithromycin   Tablet 500 milliGRAM(s) Oral daily  chlorhexidine 2% Cloths 1 Application(s) Topical daily  enoxaparin Injectable 40 milliGRAM(s) SubCutaneous every 24 hours  lactobacillus acidophilus 1 Tablet(s) Oral daily  losartan 25 milliGRAM(s) Oral daily  potassium chloride    Tablet ER 40 milliEquivalent(s) Oral once  sodium chloride 2 Gram(s) Oral three times a day    MEDICATIONS  (PRN):  albuterol/ipratropium for Nebulization 3 milliLiter(s) Nebulizer every 6 hours PRN Shortness of Breath and/or Wheezing  ondansetron   Disintegrating Tablet 4 milliGRAM(s) Oral every 8 hours PRN Nausea and/or Vomiting      Vital Signs Last 24 Hrs  T(C): 37.1 (17 May 2024 04:24), Max: 37.1 (16 May 2024 23:48)  T(F): 98.8 (17 May 2024 04:24), Max: 98.8 (17 May 2024 04:24)  HR: 108 (17 May 2024 04:24) (102 - 115)  BP: 119/78 (17 May 2024 04:24) (119/78 - 171/82)  BP(mean): --  RR: 18 (17 May 2024 05:23) (18 - 18)  SpO2: 94% (17 May 2024 05:23) (92% - 95%)    Parameters below as of 17 May 2024 05:23  Patient On (Oxygen Delivery Method): nasal cannula  O2 Flow (L/min): 4    CAPILLARY BLOOD GLUCOSE        I&O's Summary    16 May 2024 07:01  -  17 May 2024 07:00  --------------------------------------------------------  IN: 480 mL / OUT: 1650 mL / NET: -1170 mL        PHYSICAL EXAM:  GENERAL: NAD, well-developed  HEAD:  Atraumatic, Normocephalic  EYES: EOMI, PERRLA, conjunctiva and sclera clear  NECK: Supple, No JVD  CHEST/LUNG: Clear to auscultation bilaterally; No wheeze  HEART: Regular rate and rhythm; No murmurs, rubs, or gallops  ABDOMEN: Soft, Nontender, Nondistended; Bowel sounds present  EXTREMITIES:  2+ Peripheral Pulses, No clubbing, cyanosis, or edema  PSYCH: AAOx3  NEUROLOGY: non-focal  SKIN: No rashes or lesions    LABS:                        11.7   14.83 )-----------( 452      ( 17 May 2024 06:32 )             32.9     05-17    128<L>  |  92<L>  |  8   ----------------------------<  122<H>  3.6   |  24  |  0.48<L>    Ca    8.4      17 May 2024 06:32  Phos  3.0     05-17  Mg     2.4     05-17    TPro  6.7  /  Alb  3.1<L>  /  TBili  0.4  /  DBili  x   /  AST  57<H>  /  ALT  86<H>  /  AlkPhos  110  05-17    PT/INR - ( 15 May 2024 11:54 )   PT: 13.8 sec;   INR: 1.26 ratio         PTT - ( 15 May 2024 11:54 )  PTT:28.4 sec      Urinalysis Basic - ( 17 May 2024 06:32 )    Color: x / Appearance: x / SG: x / pH: x  Gluc: 122 mg/dL / Ketone: x  / Bili: x / Urobili: x   Blood: x / Protein: x / Nitrite: x   Leuk Esterase: x / RBC: x / WBC x   Sq Epi: x / Non Sq Epi: x / Bacteria: x        RADIOLOGY & ADDITIONAL TESTS:    Imaging Personally Reviewed:    Consultant(s) Notes Reviewed:      Care Discussed with Consultants/Other Providers:   DATE OF SERVICE: 05-17-24 @ 07:28    Patient is a 59y old  Female who presents with a chief complaint of Pneumonia due to infectious organism     (16 May 2024 14:44)      SUBJECTIVE / OVERNIGHT EVENTS: NAEO. Reports good appetite. Ambulated and moved to chair. Breathing feels more comfortable. No diarrhea since 9 PM on 05/16.    MEDICATIONS  (STANDING):  atorvastatin 10 milliGRAM(s) Oral at bedtime  azithromycin   Tablet 500 milliGRAM(s) Oral daily  chlorhexidine 2% Cloths 1 Application(s) Topical daily  enoxaparin Injectable 40 milliGRAM(s) SubCutaneous every 24 hours  lactobacillus acidophilus 1 Tablet(s) Oral daily  losartan 25 milliGRAM(s) Oral daily  potassium chloride    Tablet ER 40 milliEquivalent(s) Oral once  sodium chloride 2 Gram(s) Oral three times a day    MEDICATIONS  (PRN):  albuterol/ipratropium for Nebulization 3 milliLiter(s) Nebulizer every 6 hours PRN Shortness of Breath and/or Wheezing  ondansetron   Disintegrating Tablet 4 milliGRAM(s) Oral every 8 hours PRN Nausea and/or Vomiting      Vital Signs Last 24 Hrs  T(C): 37.1 (17 May 2024 04:24), Max: 37.1 (16 May 2024 23:48)  T(F): 98.8 (17 May 2024 04:24), Max: 98.8 (17 May 2024 04:24)  HR: 108 (17 May 2024 04:24) (102 - 115)  BP: 119/78 (17 May 2024 04:24) (119/78 - 171/82)  BP(mean): --  RR: 18 (17 May 2024 05:23) (18 - 18)  SpO2: 94% (17 May 2024 05:23) (92% - 95%)    Parameters below as of 17 May 2024 05:23  Patient On (Oxygen Delivery Method): nasal cannula  O2 Flow (L/min): 4    CAPILLARY BLOOD GLUCOSE        I&O's Summary    16 May 2024 07:01  -  17 May 2024 07:00  --------------------------------------------------------  IN: 480 mL / OUT: 1650 mL / NET: -1170 mL        PHYSICAL EXAM:  GENERAL: NAD, lying in bed comfortably  HEAD:  Atraumatic, Normocephalic  EYES: EOMI,  conjunctiva and sclera clear  CHEST/LUNG: increased mild diffuse expiratory wheezes, no crackles   HEART: Regular rate and rhythm; No murmurs, rubs, or gallops  ABDOMEN: BSx4; Soft, nontender, nondistended  EXTREMITIES:  2+ Peripheral Pulses, brisk capillary refill. No cyanosis or edema  NERVOUS SYSTEM:  A&Ox3, no focal deficits   SKIN: No rashes or lesions    LABS:                        11.7   14.83 )-----------( 452      ( 17 May 2024 06:32 )             32.9     05-17    128<L>  |  92<L>  |  8   ----------------------------<  122<H>  3.6   |  24  |  0.48<L>    Ca    8.4      17 May 2024 06:32  Phos  3.0     05-17  Mg     2.4     05-17    TPro  6.7  /  Alb  3.1<L>  /  TBili  0.4  /  DBili  x   /  AST  57<H>  /  ALT  86<H>  /  AlkPhos  110  05-17    PT/INR - ( 15 May 2024 11:54 )   PT: 13.8 sec;   INR: 1.26 ratio         PTT - ( 15 May 2024 11:54 )  PTT:28.4 sec      Urinalysis Basic - ( 17 May 2024 06:32 )    Color: x / Appearance: x / SG: x / pH: x  Gluc: 122 mg/dL / Ketone: x  / Bili: x / Urobili: x   Blood: x / Protein: x / Nitrite: x   Leuk Esterase: x / RBC: x / WBC x   Sq Epi: x / Non Sq Epi: x / Bacteria: x        RADIOLOGY & ADDITIONAL TESTS:    Imaging Personally Reviewed:    Consultant(s) Notes Reviewed:      Care Discussed with Consultants/Other Providers:

## 2024-05-17 NOTE — DISCHARGE NOTE PROVIDER - NSDCCPCAREPLAN_GEN_ALL_CORE_FT
PRINCIPAL DISCHARGE DIAGNOSIS  Diagnosis: CAP (community acquired pneumonia)  Assessment and Plan of Treatment:       SECONDARY DISCHARGE DIAGNOSES  Diagnosis: Hyponatremia  Assessment and Plan of Treatment:     Diagnosis: Acute diarrhea  Assessment and Plan of Treatment:      PRINCIPAL DISCHARGE DIAGNOSIS  Diagnosis: CAP (community acquired pneumonia)  Assessment and Plan of Treatment: You came to the hospital per request of your PCP due to 10 days of fever and shortness of breathe. Your white blood cell count was elevated at 23 and your chest xrays showed multiple areas of congestion typical of a pnuemonia. We treated you with IV antibiotics as well as nasal cannula oxygen. You also had low sodium levels related to the pnuemonia that improved with salt supplementation. The diarrhea self resolved. After several days of antibiotics, your oxygen needs were still high. We ruled out a clot in your lungs and gave you a nebulizer to open your airways as well as an incentive spirometer and acapella to help open the airways and remove gunk. On 05/24, you are able to walk around without oxygen. We coordinated a pulmonology  follow up outpatient on video camera to be done within 48 hours of discharge or the next business day. To evaluate the low oxygen levels at night, please follow up with the pulonologist to obtain a sleep study. Please continue to use the albuterol nebulizer, incentive spirometer and acapella and check your oxygen using the pulse ox. If you are feeling shortness of breathe and your oxygen levels are low please go to the emergency room for oxygen.      SECONDARY DISCHARGE DIAGNOSES  Diagnosis: Transaminitis  Assessment and Plan of Treatment: Your liver enzymes are high during this admission. This can happen in the setting of infection. Please follow up with your primary care doctor to check your liver enzymes 1-2 weeks from discharge. If your liver levels continue to be high after trending, you may be referred for an abdominal ultrasound to check your liver.     PRINCIPAL DISCHARGE DIAGNOSIS  Diagnosis: CAP (community acquired pneumonia)  Assessment and Plan of Treatment: You came to the hospital per request of your PCP due to 10 days of fever and shortness of breathe. Your white blood cell count was elevated at 23 and your chest xrays showed multiple areas of congestion typical of a pnuemonia. We treated you with IV antibiotics as well as nasal cannula oxygen. You also had low sodium levels related to the pnuemonia that improved with salt supplementation. The diarrhea self resolved. After several days of antibiotics, your oxygen needs were still high. We ruled out a clot in your lungs and gave you a nebulizer to open your airways as well as an incentive spirometer and acapella to help open the airways and remove gunk. On 05/24, you are able to walk around without oxygen. We coordinated a pulmonology tele appointment follow up outpatient to be done within 48 hours of discharge or the next business day. To evaluate the low oxygen levels at night, please follow up with the pulonologist to obtain a sleep study. Please continue to use the albuterol nebulizer, incentive spirometer and acapella and check your oxygen using the pulse ox. If you are feeling shortness of breathe and your oxygen levels are low please go to the emergency room for oxygen. Please follow up with your primary care provider as well within 1 week of discharge.      SECONDARY DISCHARGE DIAGNOSES  Diagnosis: Transaminitis  Assessment and Plan of Treatment: Your liver enzymes are high during this admission. This can happen in the setting of infection. Please follow up with your primary care doctor to check your liver enzymes 1-2 weeks from discharge. If your liver levels continue to be high after trending, you may be referred for an abdominal ultrasound to check your liver.

## 2024-05-17 NOTE — PROGRESS NOTE ADULT - PROBLEM SELECTOR PLAN 2
Likely in setting of infection. Legionella is associated with SIADH  Serum osm, urine osm, urine na correspond to SIADH    Plan:  -fluid restriction, salt tabs 3g TID  -BMP q8h Likely in setting of infection. 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

## 2024-05-17 NOTE — PROGRESS NOTE ADULT - PROBLEM SELECTOR PLAN 3
Diarrhea for the past 10 days when fever begun and before abx    Plan:  -encourage patient to eat and have electrolyte fluids  -if over 3 watery bowel movements, have C Diff specimen sent Diarrhea for the past 10 days when fever begun and before abx    Plan:  -improved, ctm bowel movements

## 2024-05-17 NOTE — DISCHARGE NOTE PROVIDER - NSDCCPTREATMENT_GEN_ALL_CORE_FT
PRINCIPAL PROCEDURE  Procedure: Chest xray, PA & lateral  Findings and Treatment: FINDINGS:  The heart is normal in size.  Diffuse bilateral reticular and patchy airspace opacities.  No pleural effusions.  There is no pneumothorax. No acute bony abnormality.  IMPRESSION:  Diffuse bilateral reticular and patchy airspace opacities.     PRINCIPAL PROCEDURE  Procedure: CT angiogram chest w contrast  Findings and Treatment: IMPRESSION:  No pulmonary embolism detected.  Multifocal ground-glass and consolidative opacities and tree-in-bud   nodular opacities compatible with infection.  CT chest follow-up in 3 months recommended to ensure clearing.      SECONDARY PROCEDURE  Procedure: Chest xray, PA & lateral  Findings and Treatment: FINDINGS:  The heart is normal in size.  Diffuse bilateral reticular and patchy airspace opacities.  No pleural effusions.  There is no pneumothorax. No acute bony abnormality.  IMPRESSION:  Diffuse bilateral reticular and patchy airspace opacities.       PRINCIPAL PROCEDURE  Procedure: CT angiogram chest w contrast  Findings and Treatment: IMPRESSION:  No pulmonary embolism detected.  Multifocal ground-glass and consolidative opacities and tree-in-bud   nodular opacities compatible with infection.  *CT chest follow-up in 3 months recommended to ensure clearing.*      SECONDARY PROCEDURE  Procedure: Chest xray, PA & lateral  Findings and Treatment: FINDINGS:  The heart is normal in size.  Diffuse bilateral reticular and patchy airspace opacities.  No pleural effusions.  There is no pneumothorax. No acute bony abnormality.  IMPRESSION:  Diffuse bilateral reticular and patchy airspace opacities.

## 2024-05-17 NOTE — DISCHARGE NOTE PROVIDER - NSDCMRMEDTOKEN_GEN_ALL_CORE_FT
atenolol 25 mg oral tablet: 1 tab(s) orally once a day  atorvastatin 10 mg oral tablet: 1 tab(s) orally once a day  cholecalciferol 25 mcg (1000 intl units) oral tablet: 1 tab(s) orally once a day  losartan 25 mg oral tablet: 1 tab(s) orally once a day  losartan 50 mg oral tablet: 1 tab(s) orally once a day   atorvastatin 10 mg oral tablet: 1 tab(s) orally once a day  cholecalciferol 25 mcg (1000 intl units) oral tablet: 1 tab(s) orally once a day  Toprol-XL 25 mg oral tablet, extended release: 1 tab(s) orally once a day

## 2024-05-17 NOTE — DISCHARGE NOTE PROVIDER - NSFOLLOWUPCLINICS_GEN_ALL_ED_FT
[facility]  Pulmonology  [address] [place] [address], [place] [address]  Phone: [phone]  Fax:   Follow Up Time: 1 week

## 2024-05-17 NOTE — DISCHARGE NOTE PROVIDER - ATTENDING DISCHARGE PHYSICAL EXAMINATION:
General: NAD  HEENT: NC/AT; EOMI; MMM  Cards: RRR, S1/S2  Resp: On RA, no wheeze  Abd: soft, ND/NT  Extremities: no significant LE edema  Neuro grossly nonfocal

## 2024-05-17 NOTE — DISCHARGE NOTE PROVIDER - NSDCFUSCHEDAPPT_GEN_ALL_CORE_FT
Krystal Hollis  [facility]  PULED [address] [place] [address]  Scheduled Appointment: 05/28/2024

## 2024-05-17 NOTE — DISCHARGE NOTE PROVIDER - CARE PROVIDER_API CALL
James Pierce [address], [address] [place] [address]  Phone: [phone]  Fax: [phone]  Established Patient  Follow Up Time: 1 week

## 2024-05-17 NOTE — DISCHARGE NOTE PROVIDER - NSDCFUADDAPPT_GEN_ALL_CORE_FT
APPTS ARE READY TO BE MADE: [ ] YES    Best Family or Patient Contact (if needed):    Additional Information about above appointments (if needed):    1: PCP James Pierce  2:   3:     Other comments or requests:    APPTS ARE READY TO BE MADE: [X ] YES    Best Family or Patient Contact (if needed):    Additional Information about above appointments (if needed):    1: PCP James Pierce  2:   3:     Other comments or requests:    APPTS ARE READY TO BE MADE: [X ] YES    Best Family or Patient Contact (if needed):    Additional Information about above appointments (if needed):    1: PCP James Pierce  2: Pulm-Home   3: Cardiology    Other comments or requests:    APPTS ARE READY TO BE MADE: [X ] YES    Best Family or Patient Contact (if needed):    Additional Information about above appointments (if needed):    1: PCP James Pierce  2: Pulm-Home Tele-visit   3: Cardiology    Other comments or requests:    APPTS ARE READY TO BE MADE: [X] YES    Best Family or Patient Contact (if needed):    Additional Information about above appointments (if needed):    1: PCP James Pierce 1 week follow up  2: Pulmonary HOME program   3: Cardiology 1 week follow up    Other comments or requests:    APPTS ARE READY TO BE MADE: [X] YES    Best Family or Patient Contact (if needed):    Additional Information about above appointments (if needed):    1: PCP James Pierce 1 week follow up  2: Pulmonary HOME program   3: Cardiology 1 week follow up    Other comments or requests:     Patient informed us they already have secured a follow up appointment with their PCP which is not visible on [facility].     Appointment was scheduled in [facility] 5/28 1:30PM Dr. Mcleod via telehealth Pulmonary Home Program

## 2024-05-18 LAB
ALBUMIN SERPL ELPH-MCNC: 3.2 G/DL — LOW (ref 3.3–5)
ALP SERPL-CCNC: 98 U/L — SIGNIFICANT CHANGE UP (ref 40–120)
ALT FLD-CCNC: 94 U/L — HIGH (ref 10–45)
ANION GAP SERPL CALC-SCNC: 11 MMOL/L — SIGNIFICANT CHANGE UP (ref 5–17)
AST SERPL-CCNC: 48 U/L — HIGH (ref 10–40)
BASOPHILS # BLD AUTO: 0 K/UL — SIGNIFICANT CHANGE UP (ref 0–0.2)
BASOPHILS NFR BLD AUTO: 0 % — SIGNIFICANT CHANGE UP (ref 0–2)
BILIRUB SERPL-MCNC: 0.4 MG/DL — SIGNIFICANT CHANGE UP (ref 0.2–1.2)
BUN SERPL-MCNC: 10 MG/DL — SIGNIFICANT CHANGE UP (ref 7–23)
BURR CELLS BLD QL SMEAR: PRESENT — SIGNIFICANT CHANGE UP
CALCIUM SERPL-MCNC: 8.9 MG/DL — SIGNIFICANT CHANGE UP (ref 8.4–10.5)
CHLORIDE SERPL-SCNC: 101 MMOL/L — SIGNIFICANT CHANGE UP (ref 96–108)
CO2 SERPL-SCNC: 24 MMOL/L — SIGNIFICANT CHANGE UP (ref 22–31)
CREAT SERPL-MCNC: 0.61 MG/DL — SIGNIFICANT CHANGE UP (ref 0.5–1.3)
EGFR: 104 ML/MIN/1.73M2 — SIGNIFICANT CHANGE UP
ELLIPTOCYTES BLD QL SMEAR: SLIGHT — SIGNIFICANT CHANGE UP
EOSINOPHIL # BLD AUTO: 0.15 K/UL — SIGNIFICANT CHANGE UP (ref 0–0.5)
EOSINOPHIL NFR BLD AUTO: 0.9 % — SIGNIFICANT CHANGE UP (ref 0–6)
GIANT PLATELETS BLD QL SMEAR: PRESENT — SIGNIFICANT CHANGE UP
GLUCOSE SERPL-MCNC: 106 MG/DL — HIGH (ref 70–99)
HCT VFR BLD CALC: 33.1 % — LOW (ref 34.5–45)
HGB BLD-MCNC: 11.3 G/DL — LOW (ref 11.5–15.5)
LYMPHOCYTES # BLD AUTO: 13.3 % — SIGNIFICANT CHANGE UP (ref 13–44)
LYMPHOCYTES # BLD AUTO: 2.15 K/UL — SIGNIFICANT CHANGE UP (ref 1–3.3)
MAGNESIUM SERPL-MCNC: 2.5 MG/DL — SIGNIFICANT CHANGE UP (ref 1.6–2.6)
MANUAL SMEAR VERIFICATION: SIGNIFICANT CHANGE UP
MCHC RBC-ENTMCNC: 32 PG — SIGNIFICANT CHANGE UP (ref 27–34)
MCHC RBC-ENTMCNC: 34.1 GM/DL — SIGNIFICANT CHANGE UP (ref 32–36)
MCV RBC AUTO: 93.8 FL — SIGNIFICANT CHANGE UP (ref 80–100)
MONOCYTES # BLD AUTO: 0.42 K/UL — SIGNIFICANT CHANGE UP (ref 0–0.9)
MONOCYTES NFR BLD AUTO: 2.6 % — SIGNIFICANT CHANGE UP (ref 2–14)
MYELOCYTES NFR BLD: 2.7 % — HIGH (ref 0–0)
NEUTROPHILS # BLD AUTO: 12.75 K/UL — HIGH (ref 1.8–7.4)
NEUTROPHILS NFR BLD AUTO: 75.2 % — SIGNIFICANT CHANGE UP (ref 43–77)
NEUTS BAND # BLD: 3.5 % — SIGNIFICANT CHANGE UP (ref 0–8)
PHOSPHATE SERPL-MCNC: 4.1 MG/DL — SIGNIFICANT CHANGE UP (ref 2.5–4.5)
PLAT MORPH BLD: ABNORMAL
PLATELET # BLD AUTO: 485 K/UL — HIGH (ref 150–400)
POIKILOCYTOSIS BLD QL AUTO: SLIGHT — SIGNIFICANT CHANGE UP
POLYCHROMASIA BLD QL SMEAR: SLIGHT — SIGNIFICANT CHANGE UP
POTASSIUM SERPL-MCNC: 3.9 MMOL/L — SIGNIFICANT CHANGE UP (ref 3.5–5.3)
POTASSIUM SERPL-SCNC: 3.9 MMOL/L — SIGNIFICANT CHANGE UP (ref 3.5–5.3)
PROT SERPL-MCNC: 6.5 G/DL — SIGNIFICANT CHANGE UP (ref 6–8.3)
RBC # BLD: 3.53 M/UL — LOW (ref 3.8–5.2)
RBC # FLD: 13.6 % — SIGNIFICANT CHANGE UP (ref 10.3–14.5)
RBC BLD AUTO: ABNORMAL
SODIUM SERPL-SCNC: 136 MMOL/L — SIGNIFICANT CHANGE UP (ref 135–145)
VARIANT LYMPHS # BLD: 1.8 % — SIGNIFICANT CHANGE UP (ref 0–6)
WBC # BLD: 16.2 K/UL — HIGH (ref 3.8–10.5)
WBC # FLD AUTO: 16.2 K/UL — HIGH (ref 3.8–10.5)

## 2024-05-18 PROCEDURE — 99232 SBSQ HOSP IP/OBS MODERATE 35: CPT | Mod: GC

## 2024-05-18 RX ORDER — ATENOLOL 25 MG/1
25 TABLET ORAL ONCE
Refills: 0 | Status: COMPLETED | OUTPATIENT
Start: 2024-05-18 | End: 2024-05-18

## 2024-05-18 RX ADMIN — Medication 3 MILLILITER(S): at 03:20

## 2024-05-18 RX ADMIN — CHLORHEXIDINE GLUCONATE 1 APPLICATION(S): 213 SOLUTION TOPICAL at 11:32

## 2024-05-18 RX ADMIN — LOSARTAN POTASSIUM 25 MILLIGRAM(S): 100 TABLET, FILM COATED ORAL at 05:01

## 2024-05-18 RX ADMIN — Medication 1 TABLET(S): at 11:32

## 2024-05-18 RX ADMIN — Medication 100 MILLIGRAM(S): at 05:01

## 2024-05-18 RX ADMIN — Medication 3 MILLILITER(S): at 09:34

## 2024-05-18 RX ADMIN — Medication 100 MILLIGRAM(S): at 21:25

## 2024-05-18 RX ADMIN — ATENOLOL 25 MILLIGRAM(S): 25 TABLET ORAL at 21:24

## 2024-05-18 RX ADMIN — ENOXAPARIN SODIUM 40 MILLIGRAM(S): 100 INJECTION SUBCUTANEOUS at 18:31

## 2024-05-18 RX ADMIN — CEFTRIAXONE 100 MILLIGRAM(S): 500 INJECTION, POWDER, FOR SOLUTION INTRAMUSCULAR; INTRAVENOUS at 05:01

## 2024-05-18 RX ADMIN — AZITHROMYCIN 500 MILLIGRAM(S): 500 TABLET, FILM COATED ORAL at 11:32

## 2024-05-18 RX ADMIN — Medication 3 MILLILITER(S): at 21:49

## 2024-05-18 RX ADMIN — SODIUM CHLORIDE 2 GRAM(S): 9 INJECTION INTRAMUSCULAR; INTRAVENOUS; SUBCUTANEOUS at 05:01

## 2024-05-18 RX ADMIN — ATORVASTATIN CALCIUM 10 MILLIGRAM(S): 80 TABLET, FILM COATED ORAL at 21:24

## 2024-05-18 RX ADMIN — Medication 3 MILLILITER(S): at 15:36

## 2024-05-18 NOTE — PROGRESS NOTE ADULT - SUBJECTIVE AND OBJECTIVE BOX
William Yang MD  PGY3  Preferred contact via [employer] Teams      Patient is a 59y old  Female who presents with a chief complaint of Pneumonia due to infectious organism     (16 May 2024 14:44)      SUBJECTIVE / OVERNIGHT EVENTS:    MEDICATIONS  (STANDING):  atorvastatin 10 milliGRAM(s) Oral at bedtime  azithromycin   Tablet 500 milliGRAM(s) Oral daily  cefTRIAXone   IVPB 1000 milliGRAM(s) IV Intermittent every 24 hours  chlorhexidine 2% Cloths 1 Application(s) Topical daily  enoxaparin Injectable 40 milliGRAM(s) SubCutaneous every 24 hours  lactobacillus acidophilus 1 Tablet(s) Oral daily  losartan 25 milliGRAM(s) Oral daily  sodium chloride 2 Gram(s) Oral three times a day    MEDICATIONS  (PRN):  albuterol/ipratropium for Nebulization 3 milliLiter(s) Nebulizer every 6 hours PRN Shortness of Breath and/or Wheezing  guaiFENesin Oral Liquid (Sugar-Free) 100 milliGRAM(s) Oral every 6 hours PRN Cough  ondansetron   Disintegrating Tablet 4 milliGRAM(s) Oral every 8 hours PRN Nausea and/or Vomiting      CAPILLARY BLOOD GLUCOSE        I&O's Summary    17 May 2024 07:01  -  18 May 2024 07:00  --------------------------------------------------------  IN: 480 mL / OUT: 1200 mL / NET: -720 mL        PHYSICAL EXAM:      LABS:                        11.3   16.20 )-----------( 485      ( 18 May 2024 06:16 )             33.1      05-18    136  |  101  |  10  ----------------------------<  106<H>  3.9   |  24  |  0.61    Ca    8.9      18 May 2024 06:16  Phos  4.1     05-18  Mg     2.5     05-18    TPro  6.5  /  Alb  3.2<L>  /  TBili  0.4  /  DBili  x   /  AST  48<H>  /  ALT  94<H>  /  AlkPhos  98  05-18          Urinalysis Basic - ( 18 May 2024 06:16 )    Color: x / Appearance: x / SG: x / pH: x  Gluc: 106 mg/dL / Ketone: x  / Bili: x / Urobili: x   Blood: x / Protein: x / Nitrite: x   Leuk Esterase: x / RBC: x / WBC x   Sq Epi: x / Non Sq Epi: x / Bacteria: x        RADIOLOGY & ADDITIONAL TESTS:    Imaging Personally Reviewed:    Consultant(s) Notes Reviewed:      Care Discussed with Consultants/Other Providers:   William Yang MD  PGY3  Preferred contact via Microsoft Teams      Patient is a 59y old  Female who presents with a chief complaint of Pneumonia due to infectious organism     (16 May 2024 14:44)      SUBJECTIVE / OVERNIGHT EVENTS: NAEON. Patient still on O2 and SOB. Complaining of increased sputum production. Denies chest pain, abdominal pain, N/V/d.     MEDICATIONS  (STANDING):  atorvastatin 10 milliGRAM(s) Oral at bedtime  azithromycin   Tablet 500 milliGRAM(s) Oral daily  cefTRIAXone   IVPB 1000 milliGRAM(s) IV Intermittent every 24 hours  chlorhexidine 2% Cloths 1 Application(s) Topical daily  enoxaparin Injectable 40 milliGRAM(s) SubCutaneous every 24 hours  lactobacillus acidophilus 1 Tablet(s) Oral daily  losartan 25 milliGRAM(s) Oral daily  sodium chloride 2 Gram(s) Oral three times a day    MEDICATIONS  (PRN):  albuterol/ipratropium for Nebulization 3 milliLiter(s) Nebulizer every 6 hours PRN Shortness of Breath and/or Wheezing  guaiFENesin Oral Liquid (Sugar-Free) 100 milliGRAM(s) Oral every 6 hours PRN Cough  ondansetron   Disintegrating Tablet 4 milliGRAM(s) Oral every 8 hours PRN Nausea and/or Vomiting      CAPILLARY BLOOD GLUCOSE        I&O's Summary    17 May 2024 07:01  -  18 May 2024 07:00  --------------------------------------------------------  IN: 480 mL / OUT: 1200 mL / NET: -720 mL        PHYSICAL EXAM:  GENERAL: NAD, lying in bed comfortably  HEAD:  Atraumatic, Normocephalic  EYES: EOMI,  conjunctiva and sclera clear  CHEST/LUNG: increased mild diffuse expiratory wheezes, no crackles   HEART: Regular rate and rhythm; No murmurs, rubs, or gallops  ABDOMEN: BSx4; Soft, nontender, nondistended  EXTREMITIES:  2+ Peripheral Pulses, brisk capillary refill. No cyanosis or edema  NERVOUS SYSTEM:  A&Ox3, no focal deficits   SKIN: No rashes or lesions    LABS:                        11.3   16.20 )-----------( 485      ( 18 May 2024 06:16 )             33.1      05-18    136  |  101  |  10  ----------------------------<  106<H>  3.9   |  24  |  0.61    Ca    8.9      18 May 2024 06:16  Phos  4.1     05-18  Mg     2.5     05-18    TPro  6.5  /  Alb  3.2<L>  /  TBili  0.4  /  DBili  x   /  AST  48<H>  /  ALT  94<H>  /  AlkPhos  98  05-18          Urinalysis Basic - ( 18 May 2024 06:16 )    Color: x / Appearance: x / SG: x / pH: x  Gluc: 106 mg/dL / Ketone: x  / Bili: x / Urobili: x   Blood: x / Protein: x / Nitrite: x   Leuk Esterase: x / RBC: x / WBC x   Sq Epi: x / Non Sq Epi: x / Bacteria: x        RADIOLOGY & ADDITIONAL TESTS:    Imaging Personally Reviewed:    Consultant(s) Notes Reviewed:      Care Discussed with Consultants/Other Providers:

## 2024-05-18 NOTE — PROGRESS NOTE ADULT - PROBLEM SELECTOR PLAN 1
On 4L NC, productive cough  CURB65 0, PSI: 48 points -> grade 2  Wells score 1, BNP and trops unremarkable  Etiology: Acute Bacterial Pneumonia (legionella high possibility) vs PE. Less common AIP. Low likelihood of cardiac etiology  MRSA, urine legionella negative    Plan:  -ceftriaxone 1 g IV for 5 day course  -azithromycin 500 mg for 7 day course  -duoneb 3g q6h PRN for dyspnea  -wean oxygen as tolerated  -will need ambulatory study before discharge

## 2024-05-18 NOTE — PROGRESS NOTE ADULT - PROBLEM SELECTOR PLAN 2
Likely in setting of infection. 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

## 2024-05-18 NOTE — PROGRESS NOTE ADULT - ATTENDING COMMENTS
Patient seen and evaluated. Cough, appetite, diarrhea  Lungs: improving rhonchi. Better air movement.       #Sepsis 2/2 multifocal pna  -will treat with ceftriaxone/azithromycin.   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   -send off legionella pcr via sputum.  -add acapella for airway clearance        #hyponatremia  -128 today.   -goal up to 136 tomorrow  -continue sodium chloride 2g TID  -BMP dialy    #diarrhea  -resolved Patient seen and evaluated. Cough, appetite, diarrhea. STill hypoxic. Duonebs helping.   Lungs: No rhonhi. End-expiratory wheeze on right side.       #Sepsis 2/2 multifocal pna and hypoxic respiratory failure.   -will treat with ceftriaxone/azithromycin.   -given diarrhea, hyponatremia (2/2 SIADH) will still treat for legnionella despite negative urine (70% sensitivity)  -add on legionalla PCR to sputum Cx  -given tachycardia and slowly resolving hypoxia, if not improved by 5/19, or worsening 5/18 will obtain CTPA. Discussed with patient and she is agreeable.   -acapella ordered not at bedside. Discussed with staff to get an acapella for airway clearance.     #hyponatremia  -136  -d/c salt tabs, monitor off.    #diarrhea  -resolved

## 2024-05-18 NOTE — PROGRESS NOTE ADULT - PROBLEM SELECTOR PLAN 3
Diarrhea for the past 10 days when fever begun and before abx    Plan:  -improved, ctm bowel movements

## 2024-05-19 LAB
ALBUMIN SERPL ELPH-MCNC: 3.3 G/DL — SIGNIFICANT CHANGE UP (ref 3.3–5)
ALP SERPL-CCNC: 108 U/L — SIGNIFICANT CHANGE UP (ref 40–120)
ALT FLD-CCNC: 112 U/L — HIGH (ref 10–45)
ANION GAP SERPL CALC-SCNC: 11 MMOL/L — SIGNIFICANT CHANGE UP (ref 5–17)
AST SERPL-CCNC: 56 U/L — HIGH (ref 10–40)
BASOPHILS # BLD AUTO: 0.13 K/UL — SIGNIFICANT CHANGE UP (ref 0–0.2)
BASOPHILS NFR BLD AUTO: 1 % — SIGNIFICANT CHANGE UP (ref 0–2)
BILIRUB SERPL-MCNC: 0.4 MG/DL — SIGNIFICANT CHANGE UP (ref 0.2–1.2)
BUN SERPL-MCNC: 10 MG/DL — SIGNIFICANT CHANGE UP (ref 7–23)
CALCIUM SERPL-MCNC: 9.3 MG/DL — SIGNIFICANT CHANGE UP (ref 8.4–10.5)
CHLORIDE SERPL-SCNC: 101 MMOL/L — SIGNIFICANT CHANGE UP (ref 96–108)
CO2 SERPL-SCNC: 26 MMOL/L — SIGNIFICANT CHANGE UP (ref 22–31)
CREAT SERPL-MCNC: 0.6 MG/DL — SIGNIFICANT CHANGE UP (ref 0.5–1.3)
EGFR: 104 ML/MIN/1.73M2 — SIGNIFICANT CHANGE UP
EOSINOPHIL # BLD AUTO: 0.34 K/UL — SIGNIFICANT CHANGE UP (ref 0–0.5)
EOSINOPHIL NFR BLD AUTO: 2.6 % — SIGNIFICANT CHANGE UP (ref 0–6)
GLUCOSE SERPL-MCNC: 98 MG/DL — SIGNIFICANT CHANGE UP (ref 70–99)
HCT VFR BLD CALC: 35.1 % — SIGNIFICANT CHANGE UP (ref 34.5–45)
HGB BLD-MCNC: 11.7 G/DL — SIGNIFICANT CHANGE UP (ref 11.5–15.5)
IMM GRANULOCYTES NFR BLD AUTO: 5.1 % — HIGH (ref 0–0.9)
LYMPHOCYTES # BLD AUTO: 25 % — SIGNIFICANT CHANGE UP (ref 13–44)
LYMPHOCYTES # BLD AUTO: 3.32 K/UL — HIGH (ref 1–3.3)
MAGNESIUM SERPL-MCNC: 2.4 MG/DL — SIGNIFICANT CHANGE UP (ref 1.6–2.6)
MCHC RBC-ENTMCNC: 30.8 PG — SIGNIFICANT CHANGE UP (ref 27–34)
MCHC RBC-ENTMCNC: 33.3 GM/DL — SIGNIFICANT CHANGE UP (ref 32–36)
MCV RBC AUTO: 92.4 FL — SIGNIFICANT CHANGE UP (ref 80–100)
MONOCYTES # BLD AUTO: 0.94 K/UL — HIGH (ref 0–0.9)
MONOCYTES NFR BLD AUTO: 7.1 % — SIGNIFICANT CHANGE UP (ref 2–14)
NEUTROPHILS # BLD AUTO: 7.85 K/UL — HIGH (ref 1.8–7.4)
NEUTROPHILS NFR BLD AUTO: 59.2 % — SIGNIFICANT CHANGE UP (ref 43–77)
NRBC # BLD: 0 /100 WBCS — SIGNIFICANT CHANGE UP (ref 0–0)
PHOSPHATE SERPL-MCNC: 4.2 MG/DL — SIGNIFICANT CHANGE UP (ref 2.5–4.5)
PLATELET # BLD AUTO: 548 K/UL — HIGH (ref 150–400)
POTASSIUM SERPL-MCNC: 4.5 MMOL/L — SIGNIFICANT CHANGE UP (ref 3.5–5.3)
POTASSIUM SERPL-SCNC: 4.5 MMOL/L — SIGNIFICANT CHANGE UP (ref 3.5–5.3)
PROT SERPL-MCNC: 7 G/DL — SIGNIFICANT CHANGE UP (ref 6–8.3)
RBC # BLD: 3.8 M/UL — SIGNIFICANT CHANGE UP (ref 3.8–5.2)
RBC # FLD: 13.7 % — SIGNIFICANT CHANGE UP (ref 10.3–14.5)
SODIUM SERPL-SCNC: 138 MMOL/L — SIGNIFICANT CHANGE UP (ref 135–145)
WBC # BLD: 13.26 K/UL — HIGH (ref 3.8–10.5)
WBC # FLD AUTO: 13.26 K/UL — HIGH (ref 3.8–10.5)

## 2024-05-19 PROCEDURE — 99232 SBSQ HOSP IP/OBS MODERATE 35: CPT | Mod: GC

## 2024-05-19 RX ORDER — SODIUM CHLORIDE 9 MG/ML
1000 INJECTION INTRAMUSCULAR; INTRAVENOUS; SUBCUTANEOUS
Refills: 0 | Status: DISCONTINUED | OUTPATIENT
Start: 2024-05-19 | End: 2024-05-24

## 2024-05-19 RX ADMIN — AZITHROMYCIN 500 MILLIGRAM(S): 500 TABLET, FILM COATED ORAL at 12:01

## 2024-05-19 RX ADMIN — CEFTRIAXONE 100 MILLIGRAM(S): 500 INJECTION, POWDER, FOR SOLUTION INTRAMUSCULAR; INTRAVENOUS at 05:45

## 2024-05-19 RX ADMIN — LOSARTAN POTASSIUM 25 MILLIGRAM(S): 100 TABLET, FILM COATED ORAL at 05:45

## 2024-05-19 RX ADMIN — ENOXAPARIN SODIUM 40 MILLIGRAM(S): 100 INJECTION SUBCUTANEOUS at 18:10

## 2024-05-19 RX ADMIN — CHLORHEXIDINE GLUCONATE 1 APPLICATION(S): 213 SOLUTION TOPICAL at 12:00

## 2024-05-19 RX ADMIN — Medication 100 MILLIGRAM(S): at 12:00

## 2024-05-19 RX ADMIN — Medication 1 TABLET(S): at 12:00

## 2024-05-19 RX ADMIN — ATORVASTATIN CALCIUM 10 MILLIGRAM(S): 80 TABLET, FILM COATED ORAL at 21:24

## 2024-05-19 RX ADMIN — SODIUM CHLORIDE 50 MILLILITER(S): 9 INJECTION INTRAMUSCULAR; INTRAVENOUS; SUBCUTANEOUS at 09:52

## 2024-05-19 RX ADMIN — Medication 3 MILLILITER(S): at 05:44

## 2024-05-19 RX ADMIN — Medication 3 MILLILITER(S): at 12:00

## 2024-05-19 RX ADMIN — Medication 3 MILLILITER(S): at 18:10

## 2024-05-19 RX ADMIN — Medication 100 MILLIGRAM(S): at 05:45

## 2024-05-19 RX ADMIN — Medication 100 MILLIGRAM(S): at 18:11

## 2024-05-19 NOTE — PROGRESS NOTE ADULT - SUBJECTIVE AND OBJECTIVE BOX
DATE OF SERVICE: 05-19-24 @ 07:11    Patient is a 59y old  Female who presents with a chief complaint of Pneumonia due to infectious organism     (16 May 2024 14:44)      SUBJECTIVE / OVERNIGHT EVENTS:    MEDICATIONS  (STANDING):  atorvastatin 10 milliGRAM(s) Oral at bedtime  azithromycin   Tablet 500 milliGRAM(s) Oral daily  chlorhexidine 2% Cloths 1 Application(s) Topical daily  enoxaparin Injectable 40 milliGRAM(s) SubCutaneous every 24 hours  lactobacillus acidophilus 1 Tablet(s) Oral daily  losartan 25 milliGRAM(s) Oral daily    MEDICATIONS  (PRN):  albuterol/ipratropium for Nebulization 3 milliLiter(s) Nebulizer every 6 hours PRN Shortness of Breath and/or Wheezing  guaiFENesin Oral Liquid (Sugar-Free) 100 milliGRAM(s) Oral every 6 hours PRN Cough  ondansetron   Disintegrating Tablet 4 milliGRAM(s) Oral every 8 hours PRN Nausea and/or Vomiting      Vital Signs Last 24 Hrs  T(C): 36.5 (19 May 2024 04:22), Max: 37.3 (18 May 2024 20:24)  T(F): 97.7 (19 May 2024 04:22), Max: 99.1 (18 May 2024 20:24)  HR: 87 (19 May 2024 04:22) (87 - 120)  BP: 118/79 (19 May 2024 04:22) (107/64 - 122/63)  BP(mean): --  RR: 18 (19 May 2024 04:22) (18 - 18)  SpO2: 94% (19 May 2024 04:22) (90% - 94%)    Parameters below as of 19 May 2024 04:22  Patient On (Oxygen Delivery Method): nasal cannula  O2 Flow (L/min): 3    CAPILLARY BLOOD GLUCOSE        I&O's Summary    18 May 2024 07:01  -  19 May 2024 07:00  --------------------------------------------------------  IN: 0 mL / OUT: 1950 mL / NET: -1950 mL        PHYSICAL EXAM:  GENERAL: NAD, well-developed  HEAD:  Atraumatic, Normocephalic  EYES: EOMI, PERRLA, conjunctiva and sclera clear  NECK: Supple, No JVD  CHEST/LUNG: Clear to auscultation bilaterally; No wheeze  HEART: Regular rate and rhythm; No murmurs, rubs, or gallops  ABDOMEN: Soft, Nontender, Nondistended; Bowel sounds present  EXTREMITIES:  2+ Peripheral Pulses, No clubbing, cyanosis, or edema  PSYCH: AAOx3  NEUROLOGY: non-focal  SKIN: No rashes or lesions    LABS:                        11.7   13.26 )-----------( 548      ( 19 May 2024 06:24 )             35.1     05-19    138  |  101  |  10  ----------------------------<  98  4.5   |  26  |  0.60    Ca    9.3      19 May 2024 06:26  Phos  4.2     05-19  Mg     2.4     05-19    TPro  7.0  /  Alb  3.3  /  TBili  0.4  /  DBili  x   /  AST  56<H>  /  ALT  112<H>  /  AlkPhos  108  05-19          Urinalysis Basic - ( 19 May 2024 06:26 )    Color: x / Appearance: x / SG: x / pH: x  Gluc: 98 mg/dL / Ketone: x  / Bili: x / Urobili: x   Blood: x / Protein: x / Nitrite: x   Leuk Esterase: x / RBC: x / WBC x   Sq Epi: x / Non Sq Epi: x / Bacteria: x        RADIOLOGY & ADDITIONAL TESTS:    Imaging Personally Reviewed:    Consultant(s) Notes Reviewed:      Care Discussed with Consultants/Other Providers:   DATE OF SERVICE: 05-19-24 @ 07:11    Patient is a 59y old  Female who presents with a chief complaint of Pneumonia due to infectious organism     (16 May 2024 14:44)      SUBJECTIVE / OVERNIGHT EVENTS: Feels some increased work of breathing with lower oxygen.    MEDICATIONS  (STANDING):  atorvastatin 10 milliGRAM(s) Oral at bedtime  azithromycin   Tablet 500 milliGRAM(s) Oral daily  chlorhexidine 2% Cloths 1 Application(s) Topical daily  enoxaparin Injectable 40 milliGRAM(s) SubCutaneous every 24 hours  lactobacillus acidophilus 1 Tablet(s) Oral daily  losartan 25 milliGRAM(s) Oral daily    MEDICATIONS  (PRN):  albuterol/ipratropium for Nebulization 3 milliLiter(s) Nebulizer every 6 hours PRN Shortness of Breath and/or Wheezing  guaiFENesin Oral Liquid (Sugar-Free) 100 milliGRAM(s) Oral every 6 hours PRN Cough  ondansetron   Disintegrating Tablet 4 milliGRAM(s) Oral every 8 hours PRN Nausea and/or Vomiting      Vital Signs Last 24 Hrs  T(C): 36.5 (19 May 2024 04:22), Max: 37.3 (18 May 2024 20:24)  T(F): 97.7 (19 May 2024 04:22), Max: 99.1 (18 May 2024 20:24)  HR: 87 (19 May 2024 04:22) (87 - 120)  BP: 118/79 (19 May 2024 04:22) (107/64 - 122/63)  BP(mean): --  RR: 18 (19 May 2024 04:22) (18 - 18)  SpO2: 94% (19 May 2024 04:22) (90% - 94%)    Parameters below as of 19 May 2024 04:22  Patient On (Oxygen Delivery Method): nasal cannula  O2 Flow (L/min): 3    CAPILLARY BLOOD GLUCOSE        I&O's Summary    18 May 2024 07:01  -  19 May 2024 07:00  --------------------------------------------------------  IN: 0 mL / OUT: 1950 mL / NET: -1950 mL        PHYSICAL EXAM:  GENERAL: NAD, lying in bed comfortably  HEAD:  Atraumatic, Normocephalic  EYES: EOMI,  conjunctiva and sclera clear  CHEST/LUNG: mild diffuse expiratory wheezes, no crackles   HEART: Regular rate and rhythm; No murmurs, rubs, or gallops  ABDOMEN: BSx4; Soft, nontender, nondistended  EXTREMITIES:  2+ Peripheral Pulses, brisk capillary refill. No cyanosis or edema  NERVOUS SYSTEM:  A&Ox3, no focal deficits   SKIN: No rashes or lesions    LABS:                        11.7   13.26 )-----------( 548      ( 19 May 2024 06:24 )             35.1     05-19    138  |  101  |  10  ----------------------------<  98  4.5   |  26  |  0.60    Ca    9.3      19 May 2024 06:26  Phos  4.2     05-19  Mg     2.4     05-19    TPro  7.0  /  Alb  3.3  /  TBili  0.4  /  DBili  x   /  AST  56<H>  /  ALT  112<H>  /  AlkPhos  108  05-19          Urinalysis Basic - ( 19 May 2024 06:26 )    Color: x / Appearance: x / SG: x / pH: x  Gluc: 98 mg/dL / Ketone: x  / Bili: x / Urobili: x   Blood: x / Protein: x / Nitrite: x   Leuk Esterase: x / RBC: x / WBC x   Sq Epi: x / Non Sq Epi: x / Bacteria: x        RADIOLOGY & ADDITIONAL TESTS:    Imaging Personally Reviewed:    Consultant(s) Notes Reviewed:      Care Discussed with Consultants/Other Providers:

## 2024-05-19 NOTE — PROGRESS NOTE ADULT - ATTENDING COMMENTS
Patient seen and evaluated. Slowly improving O2, but 70% of baseline.   Brought down to 2L NC 91%.Still tachycardic.    #Sepsis 2/2 multifocal pna and hypoxic respiratory failure.   -will treat with ceftriaxone/azithromycin.   -given diarrhea, hyponatremia (2/2 SIADH) will still treat for legnionella despite negative urine (70% sensitivity)  -still awaiting sputum induction for leigonella pcr.   -while slowly improving, given persistent tachycardia, still slow to come off O2, will r/o PE (mod risk ) with CTPA. Discussed with patient r/b, she is agreebale.   -continue with acapella.    #hyponatremia  -138    #diarrhea  -resolved

## 2024-05-19 NOTE — PROGRESS NOTE ADULT - PROBLEM SELECTOR PLAN 1
On 4L NC, productive cough  CURB65 0, PSI: 48 points -> grade 2  Wells score 1, BNP and trops unremarkable  Etiology: Acute Bacterial Pneumonia (legionella high possibility) vs PE. Less common AIP. Low likelihood of cardiac etiology  MRSA, urine legionella negative    Plan:  -ceftriaxone 1 g IV for 5 day course  -azithromycin 500 mg for 7 day course  -duoneb 3g q6h PRN for dyspnea  -CTPE for persistent oxygen needs  -wean oxygen as tolerated  -will need ambulatory study before discharge On 4L NC, productive cough  CURB65 0, PSI: 48 points -> grade 2  Wells score 1, BNP and trops unremarkable  Etiology: Acute Bacterial Pneumonia (legionella high possibility) vs PE. Less common AIP. Low likelihood of cardiac etiology  MRSA, urine legionella negative    Plan:  -ceftriaxone 1 g IV for 5 day course  -azithromycin 500 mg for 7 day course  -duoneb 3g q6h PRN for dyspnea  -CTPE for persistent oxygen needs, f/u read  -wean oxygen as tolerated  -will need ambulatory study before discharge

## 2024-05-19 NOTE — PROGRESS NOTE ADULT - PROBLEM SELECTOR PLAN 3
Likely in setting of infection. 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

## 2024-05-19 NOTE — PROGRESS NOTE ADULT - PROBLEM SELECTOR PLAN 2
Mild transaminitis  Etiology: drug induced (statin, azithromycin) vs MASLD    Plan:  -ctm  -hold atorvastatin  -if persistent, can consider RUQ US

## 2024-05-20 LAB
ALBUMIN SERPL ELPH-MCNC: 3.6 G/DL — SIGNIFICANT CHANGE UP (ref 3.3–5)
ALP SERPL-CCNC: 103 U/L — SIGNIFICANT CHANGE UP (ref 40–120)
ALT FLD-CCNC: 103 U/L — HIGH (ref 10–45)
ANION GAP SERPL CALC-SCNC: 9 MMOL/L — SIGNIFICANT CHANGE UP (ref 5–17)
AST SERPL-CCNC: 44 U/L — HIGH (ref 10–40)
BASOPHILS # BLD AUTO: 0.09 K/UL — SIGNIFICANT CHANGE UP (ref 0–0.2)
BASOPHILS NFR BLD AUTO: 0.8 % — SIGNIFICANT CHANGE UP (ref 0–2)
BILIRUB SERPL-MCNC: 0.4 MG/DL — SIGNIFICANT CHANGE UP (ref 0.2–1.2)
BUN SERPL-MCNC: 11 MG/DL — SIGNIFICANT CHANGE UP (ref 7–23)
CALCIUM SERPL-MCNC: 9.1 MG/DL — SIGNIFICANT CHANGE UP (ref 8.4–10.5)
CHLORIDE SERPL-SCNC: 100 MMOL/L — SIGNIFICANT CHANGE UP (ref 96–108)
CO2 SERPL-SCNC: 26 MMOL/L — SIGNIFICANT CHANGE UP (ref 22–31)
CREAT SERPL-MCNC: 0.63 MG/DL — SIGNIFICANT CHANGE UP (ref 0.5–1.3)
CULTURE RESULTS: SIGNIFICANT CHANGE UP
CULTURE RESULTS: SIGNIFICANT CHANGE UP
EGFR: 103 ML/MIN/1.73M2 — SIGNIFICANT CHANGE UP
EOSINOPHIL # BLD AUTO: 0.32 K/UL — SIGNIFICANT CHANGE UP (ref 0–0.5)
EOSINOPHIL NFR BLD AUTO: 2.9 % — SIGNIFICANT CHANGE UP (ref 0–6)
GLUCOSE SERPL-MCNC: 95 MG/DL — SIGNIFICANT CHANGE UP (ref 70–99)
HCT VFR BLD CALC: 33.6 % — LOW (ref 34.5–45)
HGB BLD-MCNC: 11.2 G/DL — LOW (ref 11.5–15.5)
IMM GRANULOCYTES NFR BLD AUTO: 3.6 % — HIGH (ref 0–0.9)
LYMPHOCYTES # BLD AUTO: 29.1 % — SIGNIFICANT CHANGE UP (ref 13–44)
LYMPHOCYTES # BLD AUTO: 3.21 K/UL — SIGNIFICANT CHANGE UP (ref 1–3.3)
MAGNESIUM SERPL-MCNC: 2.3 MG/DL — SIGNIFICANT CHANGE UP (ref 1.6–2.6)
MCHC RBC-ENTMCNC: 30.8 PG — SIGNIFICANT CHANGE UP (ref 27–34)
MCHC RBC-ENTMCNC: 33.3 GM/DL — SIGNIFICANT CHANGE UP (ref 32–36)
MCV RBC AUTO: 92.3 FL — SIGNIFICANT CHANGE UP (ref 80–100)
MONOCYTES # BLD AUTO: 0.89 K/UL — SIGNIFICANT CHANGE UP (ref 0–0.9)
MONOCYTES NFR BLD AUTO: 8.1 % — SIGNIFICANT CHANGE UP (ref 2–14)
NEUTROPHILS # BLD AUTO: 6.12 K/UL — SIGNIFICANT CHANGE UP (ref 1.8–7.4)
NEUTROPHILS NFR BLD AUTO: 55.5 % — SIGNIFICANT CHANGE UP (ref 43–77)
NRBC # BLD: 0 /100 WBCS — SIGNIFICANT CHANGE UP (ref 0–0)
PHOSPHATE SERPL-MCNC: 4.3 MG/DL — SIGNIFICANT CHANGE UP (ref 2.5–4.5)
PLATELET # BLD AUTO: 496 K/UL — HIGH (ref 150–400)
POTASSIUM SERPL-MCNC: 4.2 MMOL/L — SIGNIFICANT CHANGE UP (ref 3.5–5.3)
POTASSIUM SERPL-SCNC: 4.2 MMOL/L — SIGNIFICANT CHANGE UP (ref 3.5–5.3)
PROT SERPL-MCNC: 7.1 G/DL — SIGNIFICANT CHANGE UP (ref 6–8.3)
RBC # BLD: 3.64 M/UL — LOW (ref 3.8–5.2)
RBC # FLD: 13.5 % — SIGNIFICANT CHANGE UP (ref 10.3–14.5)
SODIUM SERPL-SCNC: 135 MMOL/L — SIGNIFICANT CHANGE UP (ref 135–145)
SPECIMEN SOURCE: SIGNIFICANT CHANGE UP
SPECIMEN SOURCE: SIGNIFICANT CHANGE UP
WBC # BLD: 11.03 K/UL — HIGH (ref 3.8–10.5)
WBC # FLD AUTO: 11.03 K/UL — HIGH (ref 3.8–10.5)

## 2024-05-20 PROCEDURE — 71275 CT ANGIOGRAPHY CHEST: CPT | Mod: 26

## 2024-05-20 PROCEDURE — 99233 SBSQ HOSP IP/OBS HIGH 50: CPT | Mod: GC

## 2024-05-20 RX ORDER — SODIUM CHLORIDE 9 MG/ML
4 INJECTION INTRAMUSCULAR; INTRAVENOUS; SUBCUTANEOUS EVERY 12 HOURS
Refills: 0 | Status: DISCONTINUED | OUTPATIENT
Start: 2024-05-20 | End: 2024-05-24

## 2024-05-20 RX ORDER — SODIUM CHLORIDE 0.65 %
1 AEROSOL, SPRAY (ML) NASAL
Refills: 0 | Status: DISCONTINUED | OUTPATIENT
Start: 2024-05-20 | End: 2024-05-24

## 2024-05-20 RX ORDER — IPRATROPIUM/ALBUTEROL SULFATE 18-103MCG
3 AEROSOL WITH ADAPTER (GRAM) INHALATION EVERY 6 HOURS
Refills: 0 | Status: DISCONTINUED | OUTPATIENT
Start: 2024-05-20 | End: 2024-05-24

## 2024-05-20 RX ADMIN — Medication 1 SPRAY(S): at 00:57

## 2024-05-20 RX ADMIN — ATORVASTATIN CALCIUM 10 MILLIGRAM(S): 80 TABLET, FILM COATED ORAL at 21:32

## 2024-05-20 RX ADMIN — Medication 3 MILLILITER(S): at 18:06

## 2024-05-20 RX ADMIN — Medication 100 MILLIGRAM(S): at 00:23

## 2024-05-20 RX ADMIN — LOSARTAN POTASSIUM 25 MILLIGRAM(S): 100 TABLET, FILM COATED ORAL at 05:12

## 2024-05-20 RX ADMIN — Medication 3 MILLILITER(S): at 14:03

## 2024-05-20 RX ADMIN — ENOXAPARIN SODIUM 40 MILLIGRAM(S): 100 INJECTION SUBCUTANEOUS at 18:06

## 2024-05-20 RX ADMIN — Medication 3 MILLILITER(S): at 08:28

## 2024-05-20 RX ADMIN — Medication 100 MILLIGRAM(S): at 18:08

## 2024-05-20 RX ADMIN — CHLORHEXIDINE GLUCONATE 1 APPLICATION(S): 213 SOLUTION TOPICAL at 12:02

## 2024-05-20 RX ADMIN — Medication 1 TABLET(S): at 12:01

## 2024-05-20 RX ADMIN — AZITHROMYCIN 500 MILLIGRAM(S): 500 TABLET, FILM COATED ORAL at 12:02

## 2024-05-20 RX ADMIN — SODIUM CHLORIDE 4 MILLILITER(S): 9 INJECTION INTRAMUSCULAR; INTRAVENOUS; SUBCUTANEOUS at 13:36

## 2024-05-20 RX ADMIN — Medication 3 MILLILITER(S): at 00:18

## 2024-05-20 RX ADMIN — Medication 100 MILLIGRAM(S): at 08:27

## 2024-05-20 NOTE — PROGRESS NOTE ADULT - ATTENDING COMMENTS
#Sepsis 2/2 multifocal pna and hypoxic respiratory failure.   Symptoms improving. Remains on 2L NC. Wean as tolerated. Diarrhea resolved.   -c/w ceftriaxone/azithromycin.   -while slowly improving, given persistent tachycardia, still slow to come off O2, will r/o PE (mod risk ) with CTPA.   -continue with acapella

## 2024-05-20 NOTE — PROGRESS NOTE ADULT - PROBLEM SELECTOR PLAN 3
Likely in setting of infection. 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

## 2024-05-20 NOTE — PROGRESS NOTE ADULT - SUBJECTIVE AND OBJECTIVE BOX
DATE OF SERVICE: 05-20-24 @ 07:24    Patient is a 59y old  Female who presents with a chief complaint of Pneumonia due to infectious organism     (16 May 2024 14:44)      SUBJECTIVE / OVERNIGHT EVENTS:    MEDICATIONS  (STANDING):  atorvastatin 10 milliGRAM(s) Oral at bedtime  azithromycin   Tablet 500 milliGRAM(s) Oral daily  chlorhexidine 2% Cloths 1 Application(s) Topical daily  enoxaparin Injectable 40 milliGRAM(s) SubCutaneous every 24 hours  lactobacillus acidophilus 1 Tablet(s) Oral daily  losartan 25 milliGRAM(s) Oral daily  sodium chloride 0.9%. 1000 milliLiter(s) (50 mL/Hr) IV Continuous <Continuous>    MEDICATIONS  (PRN):  albuterol/ipratropium for Nebulization 3 milliLiter(s) Nebulizer every 6 hours PRN Shortness of Breath and/or Wheezing  guaiFENesin Oral Liquid (Sugar-Free) 100 milliGRAM(s) Oral every 6 hours PRN Cough  ondansetron   Disintegrating Tablet 4 milliGRAM(s) Oral every 8 hours PRN Nausea and/or Vomiting  sodium chloride 0.65% Nasal 1 Spray(s) Both Nostrils two times a day PRN dry nose      Vital Signs Last 24 Hrs  T(C): 36.4 (20 May 2024 04:16), Max: 37.3 (19 May 2024 20:35)  T(F): 97.6 (20 May 2024 04:16), Max: 99.1 (19 May 2024 20:35)  HR: 98 (20 May 2024 04:16) (96 - 115)  BP: 123/79 (20 May 2024 04:16) (108/73 - 123/79)  BP(mean): --  RR: 18 (20 May 2024 04:16) (18 - 18)  SpO2: 94% (20 May 2024 04:16) (92% - 98%)    Parameters below as of 20 May 2024 04:16  Patient On (Oxygen Delivery Method): nasal cannula  O2 Flow (L/min): 3    CAPILLARY BLOOD GLUCOSE        I&O's Summary    19 May 2024 07:01  -  20 May 2024 07:00  --------------------------------------------------------  IN: 0 mL / OUT: 2150 mL / NET: -2150 mL        PHYSICAL EXAM:  GENERAL: NAD, well-developed  HEAD:  Atraumatic, Normocephalic  EYES: EOMI, PERRLA, conjunctiva and sclera clear  NECK: Supple, No JVD  CHEST/LUNG: Clear to auscultation bilaterally; No wheeze  HEART: Regular rate and rhythm; No murmurs, rubs, or gallops  ABDOMEN: Soft, Nontender, Nondistended; Bowel sounds present  EXTREMITIES:  2+ Peripheral Pulses, No clubbing, cyanosis, or edema  PSYCH: AAOx3  NEUROLOGY: non-focal  SKIN: No rashes or lesions    LABS:                        11.2   11.03 )-----------( 496      ( 20 May 2024 05:53 )             33.6     05-20    135  |  100  |  11  ----------------------------<  95  4.2   |  26  |  0.63    Ca    9.1      20 May 2024 05:53  Phos  4.3     05-20  Mg     2.3     05-20    TPro  7.1  /  Alb  3.6  /  TBili  0.4  /  DBili  x   /  AST  44<H>  /  ALT  103<H>  /  AlkPhos  103  05-20          Urinalysis Basic - ( 20 May 2024 05:53 )    Color: x / Appearance: x / SG: x / pH: x  Gluc: 95 mg/dL / Ketone: x  / Bili: x / Urobili: x   Blood: x / Protein: x / Nitrite: x   Leuk Esterase: x / RBC: x / WBC x   Sq Epi: x / Non Sq Epi: x / Bacteria: x        RADIOLOGY & ADDITIONAL TESTS:    Imaging Personally Reviewed:    Consultant(s) Notes Reviewed:      Care Discussed with Consultants/Other Providers:   DATE OF SERVICE: 05-20-24 @ 07:24    Patient is a 59y old  Female who presents with a chief complaint of Pneumonia due to infectious organism     (16 May 2024 14:44)      SUBJECTIVE / OVERNIGHT EVENTS: Did not tolerate 2L NC well so placed on 3L. Otherwise no events.     MEDICATIONS  (STANDING):  atorvastatin 10 milliGRAM(s) Oral at bedtime  azithromycin   Tablet 500 milliGRAM(s) Oral daily  chlorhexidine 2% Cloths 1 Application(s) Topical daily  enoxaparin Injectable 40 milliGRAM(s) SubCutaneous every 24 hours  lactobacillus acidophilus 1 Tablet(s) Oral daily  losartan 25 milliGRAM(s) Oral daily  sodium chloride 0.9%. 1000 milliLiter(s) (50 mL/Hr) IV Continuous <Continuous>    MEDICATIONS  (PRN):  albuterol/ipratropium for Nebulization 3 milliLiter(s) Nebulizer every 6 hours PRN Shortness of Breath and/or Wheezing  guaiFENesin Oral Liquid (Sugar-Free) 100 milliGRAM(s) Oral every 6 hours PRN Cough  ondansetron   Disintegrating Tablet 4 milliGRAM(s) Oral every 8 hours PRN Nausea and/or Vomiting  sodium chloride 0.65% Nasal 1 Spray(s) Both Nostrils two times a day PRN dry nose      Vital Signs Last 24 Hrs  T(C): 36.4 (20 May 2024 04:16), Max: 37.3 (19 May 2024 20:35)  T(F): 97.6 (20 May 2024 04:16), Max: 99.1 (19 May 2024 20:35)  HR: 98 (20 May 2024 04:16) (96 - 115)  BP: 123/79 (20 May 2024 04:16) (108/73 - 123/79)  BP(mean): --  RR: 18 (20 May 2024 04:16) (18 - 18)  SpO2: 94% (20 May 2024 04:16) (92% - 98%)    Parameters below as of 20 May 2024 04:16  Patient On (Oxygen Delivery Method): nasal cannula  O2 Flow (L/min): 3    CAPILLARY BLOOD GLUCOSE        I&O's Summary    19 May 2024 07:01  -  20 May 2024 07:00  --------------------------------------------------------  IN: 0 mL / OUT: 2150 mL / NET: -2150 mL        PHYSICAL EXAM:  GENERAL: NAD, lying in bed comfortably  HEAD:  Atraumatic, Normocephalic  EYES: EOMI,  conjunctiva and sclera clear  CHEST/LUNG: mild diffuse expiratory wheezes, no crackles   HEART: Regular rate and rhythm; No murmurs, rubs, or gallops  ABDOMEN: BSx4; Soft, nontender, nondistended  EXTREMITIES:  2+ Peripheral Pulses, brisk capillary refill. No cyanosis or edema  NERVOUS SYSTEM:  A&Ox3, no focal deficits   SKIN: No rashes or lesions    LABS:                        11.2   11.03 )-----------( 496      ( 20 May 2024 05:53 )             33.6     05-20    135  |  100  |  11  ----------------------------<  95  4.2   |  26  |  0.63    Ca    9.1      20 May 2024 05:53  Phos  4.3     05-20  Mg     2.3     05-20    TPro  7.1  /  Alb  3.6  /  TBili  0.4  /  DBili  x   /  AST  44<H>  /  ALT  103<H>  /  AlkPhos  103  05-20          Urinalysis Basic - ( 20 May 2024 05:53 )    Color: x / Appearance: x / SG: x / pH: x  Gluc: 95 mg/dL / Ketone: x  / Bili: x / Urobili: x   Blood: x / Protein: x / Nitrite: x   Leuk Esterase: x / RBC: x / WBC x   Sq Epi: x / Non Sq Epi: x / Bacteria: x        RADIOLOGY & ADDITIONAL TESTS:    Imaging Personally Reviewed:    Consultant(s) Notes Reviewed:      Care Discussed with Consultants/Other Providers:

## 2024-05-20 NOTE — DIETITIAN NUTRITION RISK NOTIFICATION - COMMENTS
Noted acute malnutrition now resolved (5/20), pt with return of good appetite/intake in setting of pneumonia treatment

## 2024-05-20 NOTE — DIETITIAN NUTRITION RISK NOTIFICATION - TREATMENT: THE FOLLOWING DIET HAS BEEN RECOMMENDED
Diet, Regular:   Prosource Gelatein Plus     Qty per Day:  1 (05-18-24 @ 10:22) [Active]  Diet, Regular:   1500mL Fluid Restriction ([id number])  Prosource Gelatein Plus     Qty per Day:  1 (05-16-24 @ 15:20) [Pending Verification By Attending]

## 2024-05-20 NOTE — PROGRESS NOTE ADULT - PROBLEM SELECTOR PLAN 1
On 4L NC, productive cough  CURB65 0, PSI: 48 points -> grade 2  Wells score 1, BNP and trops unremarkable  Etiology: Acute Bacterial Pneumonia (legionella high possibility) vs PE. Less common AIP. Low likelihood of cardiac etiology  MRSA, urine legionella negative    Plan:  -ceftriaxone 1 g IV for 5 day course  -azithromycin 500 mg for 7 day course  -duoneb 3g q6h PRN for dyspnea  -CTPE for persistent oxygen needs, f/u read  -wean oxygen as tolerated  -will need ambulatory study before discharge On 4L NC, productive cough  CURB65 0, PSI: 48 points -> grade 2  Wells score 1, BNP and trops unremarkable  Etiology: Acute Bacterial Pneumonia (legionella high possibility) vs PE. Less common AIP. Low likelihood of cardiac etiology  MRSA, urine legionella negative  ceftriaxone 1 g IV for 5 day course, completed    Plan:  -azithromycin 500 mg for 7 day course, EOT on 05/21  -duoneb 3g q6h standing for dyspnea  -hypertonic saline 3%, q12h  -CTPE for persistent oxygen needs, f/u read  -wean oxygen as tolerated  -will need ambulatory study before discharge

## 2024-05-20 NOTE — CHART NOTE - NSCHARTNOTEFT_GEN_A_CORE
NUTRITION FOLLOW UP NOTE    PATIENT SEEN FOR: Malnutrition follow-up     SOURCE: [x] Patient  [x ] Current Medical Record  [ ] RN  [ ] Family/support person at bedside  [ ] Patient unavailable/inappropriate  [ ] Other    CHART REVIEWED/EVENTS NOTED.  [ ] No changes to nutrition care plan to note  [x ] Additional nutrition interventions warranted: see below    DIET ORDER:   Diet, Regular:   Prosource Gelatein Plus     Qty per Day:  1 (05-18-24)    CURRENT DIET ORDER IS:  [x] Appropriate  - RD to remove supplements in setting of good intake resumed  [ ] Inadequate:    NUTRITION INTAKE/PROVISION:  [x] PO: pt endorsed good appetite has returned, eating 100% of meals, requesting cessation of supplements and addition of snacks with dinner   [ ] EN:  [ ] PN:    ANTHROPOMETRICS:  Drug Dosing Weight  Height (cm): 152.4 (15 May 2024 10:39)  Weight (kg): 70.3 (15 May 2024 10:39)  BMI (kg/m2): 30.3 (15 May 2024 10:39)  - No daily wts in chart to assess, RD unable to obtain bedscale wt as pt out of bed to chair. RD to continue to monitor wt as able     NUTRITIONALLY PERTINENT MEDICATIONS:  MEDICATIONS  (STANDING):  atorvastatin 10 milliGRAM(s) Oral at bedtime  azithromycin   Tablet 500 milliGRAM(s) Oral daily  chlorhexidine 2% Cloths 1 Application(s) Topical daily  enoxaparin Injectable 40 milliGRAM(s) SubCutaneous every 24 hours  lactobacillus acidophilus 1 Tablet(s) Oral daily  losartan 25 milliGRAM(s) Oral daily  sodium chloride 0.9%. 1000 milliLiter(s) (50 mL/Hr) IV Continuous <Continuous>    MEDICATIONS  (PRN):  albuterol/ipratropium for Nebulization 3 milliLiter(s) Nebulizer every 6 hours PRN Shortness of Breath and/or Wheezing  guaiFENesin Oral Liquid (Sugar-Free) 100 milliGRAM(s) Oral every 6 hours PRN Cough  ondansetron   Disintegrating Tablet 4 milliGRAM(s) Oral every 8 hours PRN Nausea and/or Vomiting  sodium chloride 0.65% Nasal 1 Spray(s) Both Nostrils two times a day PRN dry nose    NUTRITIONALLY PERTINENT LABS:  05-20 Na135 mmol/L Glu 95 mg/dL K+ 4.2 mmol/L Cr  0.63 mg/dL BUN 11 mg/dL 05-20 Phos 4.3 mg/dL 05-20 Alb 3.6 g/dL05-20  U/L<H> AST 44 U/L<H> Alkaline Phosphatase 103 U/L    [ ] Nutritionally relevant changes to labs/medications:    EDEMA:  [x] Reviewed - newly noted (1+) bilateral foot, bilateral leg edema    GI/ I&O:  [x] Reviewed  [x] Other: pt reports diarrhea has resolved and is having regular BMs    SKIN:   [ ] No pressure injuries documented, per nursing flowsheet  [x] Pressure injury previously noted - suspected sacral deep tissue injury per flowsheets, per wound care pt with no pressure injuries   [ ] Change in pressure injury documentation:  [ ] Other:    ESTIMATED NEEDS:  [x] No change  [ ] Updated: based on weight:  Energy: 3670-2129 kcal/day (22-27 kcal/kg)  Protein: 70-84 g/day (1-1.2 g/kg)  Fluid:         ml/day or [x] defer to team    NUTRITION DIAGNOSIS:  [x] Prior Dx: Severe acute malnutrition - **now resolved - no nutrition diagnosis at this time  - Noted malnutrition sticker placed in chart 5/20 for assessment performed on 5/16  [ ] New Dx:    NUTRITION CARE PLAN:  [ ] Achieved - Continue nutrition intervention(s)    [x] In Progress - Recommendations:  1. Diet: continue regular diet, defer diet texture/fluid consistency to team   2. Supplements: Remove [employer] and prosource gelatein plus supplements per pt preference/ in setting of good intake of meals  3. Other:    [ ] Current medical condition precludes nutrition intervention at this time.    EDUCATION:  [ ] Yes:  [x] Not appropriate/warranted    MONITORING AND EVALUATION:   RD remains available upon request and will follow up per protocol.    Raiza Dejesus RD - available on [facility] TE[facility] NUTRITION FOLLOW UP NOTE    PATIENT SEEN FOR: Malnutrition follow-up     SOURCE: [x] Patient  [x ] Current Medical Record  [ ] RN  [ ] Family/support person at bedside  [ ] Patient unavailable/inappropriate  [ ] Other    CHART REVIEWED/EVENTS NOTED.  [ ] No changes to nutrition care plan to note  [x ] Additional nutrition interventions warranted: see below    DIET ORDER:   Diet, Regular:   Prosource Gelatein Plus     Qty per Day:  1 (05-18-24)    CURRENT DIET ORDER IS:  [x] Appropriate  - RD to remove supplements in setting of good intake resumed  [ ] Inadequate:    NUTRITION INTAKE/PROVISION:  [x] PO: pt endorsed good appetite has returned, eating 100% of meals, requesting cessation of supplements and addition of snacks with dinner   [ ] EN:  [ ] PN:    ANTHROPOMETRICS:  Drug Dosing Weight  Height (cm): 152.4 (15 May 2024 10:39)  Weight (kg): 70.3 (15 May 2024 10:39)  BMI (kg/m2): 30.3 (15 May 2024 10:39)  - No daily wts in chart to assess, RD unable to obtain bedscale wt as pt out of bed to chair. RD to continue to monitor wt as able     NUTRITIONALLY PERTINENT MEDICATIONS:  MEDICATIONS  (STANDING):  atorvastatin 10 milliGRAM(s) Oral at bedtime  azithromycin   Tablet 500 milliGRAM(s) Oral daily  chlorhexidine 2% Cloths 1 Application(s) Topical daily  enoxaparin Injectable 40 milliGRAM(s) SubCutaneous every 24 hours  lactobacillus acidophilus 1 Tablet(s) Oral daily  losartan 25 milliGRAM(s) Oral daily  sodium chloride 0.9%. 1000 milliLiter(s) (50 mL/Hr) IV Continuous <Continuous>    MEDICATIONS  (PRN):  albuterol/ipratropium for Nebulization 3 milliLiter(s) Nebulizer every 6 hours PRN Shortness of Breath and/or Wheezing  guaiFENesin Oral Liquid (Sugar-Free) 100 milliGRAM(s) Oral every 6 hours PRN Cough  ondansetron   Disintegrating Tablet 4 milliGRAM(s) Oral every 8 hours PRN Nausea and/or Vomiting  sodium chloride 0.65% Nasal 1 Spray(s) Both Nostrils two times a day PRN dry nose    NUTRITIONALLY PERTINENT LABS:  05-20 Na135 mmol/L Glu 95 mg/dL K+ 4.2 mmol/L Cr  0.63 mg/dL BUN 11 mg/dL 05-20 Phos 4.3 mg/dL 05-20 Alb 3.6 g/dL05-20  U/L<H> AST 44 U/L<H> Alkaline Phosphatase 103 U/L    [x] Nutritionally relevant changes to labs/medications:  - Pt noted with likely SIADH and improving per IM, sodium WDL as of 5/18    EDEMA:  [x] Reviewed - newly noted (1+) bilateral foot, bilateral leg edema    GI/ I&O:  [x] Reviewed  [x] Other: pt reports diarrhea has resolved and is having regular BMs    SKIN:   [ ] No pressure injuries documented, per nursing flowsheet  [x] Pressure injury previously noted - suspected sacral deep tissue injury per flowsheets, per wound care pt with no pressure injuries   [ ] Change in pressure injury documentation:  [ ] Other:    ESTIMATED NEEDS:  [x] No change  [ ] Updated: based on weight:  Energy: 4138-2476 kcal/day (22-27 kcal/kg)  Protein: 70-84 g/day (1-1.2 g/kg)  Fluid:         ml/day or [x] defer to team    NUTRITION DIAGNOSIS:  [x] Prior Dx: Severe acute malnutrition - **now resolved - no nutrition diagnosis at this time  - Noted malnutrition sticker placed in chart 5/20 for assessment performed on 5/16  [ ] New Dx:    NUTRITION CARE PLAN:  [ ] Achieved - Continue nutrition intervention(s)    [x] In Progress - Recommendations:  1. Diet: continue regular diet, defer diet texture/fluid consistency to team   2. Supplements: Remove [employer] and prosource gelatein plus supplements per pt preference/ in setting of good intake of meals  3. Other:    [ ] Current medical condition precludes nutrition intervention at this time.    EDUCATION:  [ ] Yes:  [x] Not appropriate/warranted    MONITORING AND EVALUATION:   RD remains available upon request and will follow up per protocol.    Raiza Dejesus RD - available on [facility] TE[facility]

## 2024-05-21 LAB
ADD ON TEST-SPECIMEN IN LAB: SIGNIFICANT CHANGE UP
ALBUMIN SERPL ELPH-MCNC: 3.3 G/DL — SIGNIFICANT CHANGE UP (ref 3.3–5)
ALP SERPL-CCNC: 97 U/L — SIGNIFICANT CHANGE UP (ref 40–120)
ALT FLD-CCNC: 93 U/L — HIGH (ref 10–45)
ANION GAP SERPL CALC-SCNC: 14 MMOL/L — SIGNIFICANT CHANGE UP (ref 5–17)
AST SERPL-CCNC: 35 U/L — SIGNIFICANT CHANGE UP (ref 10–40)
BASOPHILS # BLD AUTO: 0.11 K/UL — SIGNIFICANT CHANGE UP (ref 0–0.2)
BASOPHILS NFR BLD AUTO: 1 % — SIGNIFICANT CHANGE UP (ref 0–2)
BILIRUB SERPL-MCNC: 0.4 MG/DL — SIGNIFICANT CHANGE UP (ref 0.2–1.2)
BUN SERPL-MCNC: 9 MG/DL — SIGNIFICANT CHANGE UP (ref 7–23)
CALCIUM SERPL-MCNC: 9.4 MG/DL — SIGNIFICANT CHANGE UP (ref 8.4–10.5)
CHLORIDE SERPL-SCNC: 102 MMOL/L — SIGNIFICANT CHANGE UP (ref 96–108)
CO2 SERPL-SCNC: 23 MMOL/L — SIGNIFICANT CHANGE UP (ref 22–31)
CREAT SERPL-MCNC: 0.59 MG/DL — SIGNIFICANT CHANGE UP (ref 0.5–1.3)
EGFR: 104 ML/MIN/1.73M2 — SIGNIFICANT CHANGE UP
EOSINOPHIL # BLD AUTO: 0.33 K/UL — SIGNIFICANT CHANGE UP (ref 0–0.5)
EOSINOPHIL NFR BLD AUTO: 3.1 % — SIGNIFICANT CHANGE UP (ref 0–6)
GLUCOSE SERPL-MCNC: 105 MG/DL — HIGH (ref 70–99)
HCT VFR BLD CALC: 32.6 % — LOW (ref 34.5–45)
HGB BLD-MCNC: 11 G/DL — LOW (ref 11.5–15.5)
IMM GRANULOCYTES NFR BLD AUTO: 1.9 % — HIGH (ref 0–0.9)
LYMPHOCYTES # BLD AUTO: 29.1 % — SIGNIFICANT CHANGE UP (ref 13–44)
LYMPHOCYTES # BLD AUTO: 3.13 K/UL — SIGNIFICANT CHANGE UP (ref 1–3.3)
MAGNESIUM SERPL-MCNC: 2.4 MG/DL — SIGNIFICANT CHANGE UP (ref 1.6–2.6)
MCHC RBC-ENTMCNC: 31.3 PG — SIGNIFICANT CHANGE UP (ref 27–34)
MCHC RBC-ENTMCNC: 33.7 GM/DL — SIGNIFICANT CHANGE UP (ref 32–36)
MCV RBC AUTO: 92.9 FL — SIGNIFICANT CHANGE UP (ref 80–100)
MONOCYTES # BLD AUTO: 0.9 K/UL — SIGNIFICANT CHANGE UP (ref 0–0.9)
MONOCYTES NFR BLD AUTO: 8.4 % — SIGNIFICANT CHANGE UP (ref 2–14)
NEUTROPHILS # BLD AUTO: 6.08 K/UL — SIGNIFICANT CHANGE UP (ref 1.8–7.4)
NEUTROPHILS NFR BLD AUTO: 56.5 % — SIGNIFICANT CHANGE UP (ref 43–77)
NRBC # BLD: 0 /100 WBCS — SIGNIFICANT CHANGE UP (ref 0–0)
NT-PROBNP SERPL-SCNC: <36 PG/ML — SIGNIFICANT CHANGE UP (ref 0–300)
PHOSPHATE SERPL-MCNC: 4.7 MG/DL — HIGH (ref 2.5–4.5)
PLATELET # BLD AUTO: 503 K/UL — HIGH (ref 150–400)
POTASSIUM SERPL-MCNC: 4.7 MMOL/L — SIGNIFICANT CHANGE UP (ref 3.5–5.3)
POTASSIUM SERPL-SCNC: 4.7 MMOL/L — SIGNIFICANT CHANGE UP (ref 3.5–5.3)
PROT SERPL-MCNC: 6.7 G/DL — SIGNIFICANT CHANGE UP (ref 6–8.3)
RBC # BLD: 3.51 M/UL — LOW (ref 3.8–5.2)
RBC # FLD: 13.6 % — SIGNIFICANT CHANGE UP (ref 10.3–14.5)
SODIUM SERPL-SCNC: 139 MMOL/L — SIGNIFICANT CHANGE UP (ref 135–145)
WBC # BLD: 10.75 K/UL — HIGH (ref 3.8–10.5)
WBC # FLD AUTO: 10.75 K/UL — HIGH (ref 3.8–10.5)

## 2024-05-21 PROCEDURE — 99233 SBSQ HOSP IP/OBS HIGH 50: CPT | Mod: GC

## 2024-05-21 PROCEDURE — 99222 1ST HOSP IP/OBS MODERATE 55: CPT

## 2024-05-21 RX ORDER — FUROSEMIDE 40 MG
40 TABLET ORAL ONCE
Refills: 0 | Status: COMPLETED | OUTPATIENT
Start: 2024-05-21 | End: 2024-05-21

## 2024-05-21 RX ORDER — LANOLIN ALCOHOL/MO/W.PET/CERES
3 CREAM (GRAM) TOPICAL AT BEDTIME
Refills: 0 | Status: DISCONTINUED | OUTPATIENT
Start: 2024-05-21 | End: 2024-05-24

## 2024-05-21 RX ADMIN — Medication 1 SPRAY(S): at 00:11

## 2024-05-21 RX ADMIN — ENOXAPARIN SODIUM 40 MILLIGRAM(S): 100 INJECTION SUBCUTANEOUS at 17:42

## 2024-05-21 RX ADMIN — Medication 1 TABLET(S): at 11:22

## 2024-05-21 RX ADMIN — LOSARTAN POTASSIUM 25 MILLIGRAM(S): 100 TABLET, FILM COATED ORAL at 05:50

## 2024-05-21 RX ADMIN — Medication 3 MILLILITER(S): at 17:42

## 2024-05-21 RX ADMIN — CHLORHEXIDINE GLUCONATE 1 APPLICATION(S): 213 SOLUTION TOPICAL at 11:24

## 2024-05-21 RX ADMIN — AZITHROMYCIN 500 MILLIGRAM(S): 500 TABLET, FILM COATED ORAL at 11:22

## 2024-05-21 RX ADMIN — SODIUM CHLORIDE 4 MILLILITER(S): 9 INJECTION INTRAMUSCULAR; INTRAVENOUS; SUBCUTANEOUS at 05:51

## 2024-05-21 RX ADMIN — ATORVASTATIN CALCIUM 10 MILLIGRAM(S): 80 TABLET, FILM COATED ORAL at 22:28

## 2024-05-21 RX ADMIN — Medication 3 MILLILITER(S): at 05:51

## 2024-05-21 RX ADMIN — Medication 100 MILLIGRAM(S): at 00:11

## 2024-05-21 RX ADMIN — SODIUM CHLORIDE 4 MILLILITER(S): 9 INJECTION INTRAMUSCULAR; INTRAVENOUS; SUBCUTANEOUS at 17:43

## 2024-05-21 RX ADMIN — Medication 3 MILLILITER(S): at 23:06

## 2024-05-21 RX ADMIN — Medication 1 SPRAY(S): at 11:23

## 2024-05-21 RX ADMIN — Medication 3 MILLILITER(S): at 00:11

## 2024-05-21 RX ADMIN — Medication 3 MILLILITER(S): at 11:21

## 2024-05-21 RX ADMIN — Medication 40 MILLIGRAM(S): at 15:26

## 2024-05-21 RX ADMIN — Medication 3 MILLIGRAM(S): at 22:54

## 2024-05-21 NOTE — PROGRESS NOTE ADULT - PROBLEM SELECTOR PLAN 1
On 4L NC, productive cough  CURB65 0, PSI: 48 points -> grade 2  Wells score 1, BNP and trops unremarkable  Etiology: Acute Bacterial Pneumonia (legionella high possibility) vs PE. Less common AIP. Low likelihood of cardiac etiology  MRSA, urine legionella negative  ceftriaxone 1 g IV for 5 day course, completed    Plan:  -azithromycin 500 mg for 7 day course, EOT on 05/21  -duoneb 3g q6h standing for dyspnea  -hypertonic saline 3%, q12h  -CTPE for persistent oxygen needs, f/u read  -wean oxygen as tolerated  -will need ambulatory study before discharge On 4L NC, productive cough  CURB65 0, PSI: 48 points -> grade 2  Wells score 1, BNP and trops unremarkable  Etiology: Acute Bacterial Pneumonia (legionella high possibility) vs PE. Less common AIP. Low likelihood of cardiac etiology  MRSA, urine legionella negative  ceftriaxone 1 g IV for 5 day course, completed    Plan:  -azithromycin 500 mg for 7 day course, EOT on 05/21  -lasix 40 mg IV one time for superimposed pulmonary edema, goal net output 1-2 L negative  -duoneb 3g q6h standing for dyspnea  -hypertonic saline 3%, q12h  -CTPE for persistent oxygen needs, f/u read  -wean oxygen as tolerated  -will need ambulatory study before discharge

## 2024-05-21 NOTE — CONSULT NOTE ADULT - SUBJECTIVE AND OBJECTIVE BOX
CHIEF COMPLAINT: Dyspnea    HPI: 60 y/o F nonsmoker w/HTN and no hx of pulmonary disease admitted for fever and dyspnea likely secondary to PNA. Patient was treated for PNA with improvement in symptoms, however remains hypoxemic. Patient reports that currently her breathing is improved, however she still does have some dyspnea with exertion. Reports cough productive of thin clear/whitish phlegm. No current fevers, chills, nausea, emesis, or diarrhea. Reports some lower extremity swelling, which she states has improved. Does not know if she has orthopnea as she has not tried lying flat.    PAST MEDICAL & SURGICAL HISTORY:  HTN (hypertension)      Hyperlipidemia      No significant past surgical history          FAMILY HISTORY:  FH: colon cancer (Sibling)    FH: HTN (hypertension)        SOCIAL HISTORY:  No tobacco, alcohol, or drug use.    Allergies    sulfa drugs (Hives)    Intolerances        HOME MEDICATIONS:  Home Medications:  atenolol 25 mg oral tablet: 1 tab(s) orally once a day (15 May 2024 15:52)  atorvastatin 10 mg oral tablet: 1 tab(s) orally once a day (15 May 2024 15:52)  cholecalciferol 25 mcg (1000 intl units) oral tablet: 1 tab(s) orally once a day (16 May 2024 12:10)  losartan 25 mg oral tablet: 1 tab(s) orally once a day (15 May 2024 15:52)  losartan 50 mg oral tablet: 1 tab(s) orally once a day (16 May 2024 12:10)      REVIEW OF SYSTEMS:  See above. ROS otherwise negative.     OBJECTIVE:  ICU Vital Signs Last 24 Hrs  T(C): 36.7 (21 May 2024 10:54), Max: 36.9 (20 May 2024 13:40)  T(F): 98.1 (21 May 2024 10:54), Max: 98.4 (20 May 2024 13:40)  HR: 110 (21 May 2024 10:54) (67 - 116)  BP: 116/73 (21 May 2024 10:54) (106/69 - 161/74)  BP(mean): --  ABP: --  ABP(mean): --  RR: 18 (21 May 2024 10:54) (18 - 18)  SpO2: 94% (21 May 2024 10:54) (94% - 96%)    O2 Parameters below as of 21 May 2024 10:54  Patient On (Oxygen Delivery Method): nasal cannula  O2 Flow (L/min): 2            05-20 @ 07:01 - 05-21 @ 07:00  --------------------------------------------------------  IN: 850 mL / OUT: 1203 mL / NET: -353 mL    05-21 @ 07:01 - 05-21 @ 12:31  --------------------------------------------------------  IN: 480 mL / OUT: 0 mL / NET: 480 mL      CAPILLARY BLOOD GLUCOSE          PHYSICAL EXAM:  General: Adult female sitting comfortably in chair, NAD  HEENT: NC/AT sclerae anicteric  Neck: Supple  Respiratory: No increased WOB, bibasilar crackles  Cardiovascular: S1, S2  Abdomen: Soft, + BS  Extremities: WWP, trace edema  Neurological: Awake, alert, follwos commands  Psychiatry: Appropriate affect    HOSPITAL MEDICATIONS:  Standing Meds:  albuterol/ipratropium for Nebulization 3 milliLiter(s) Nebulizer every 6 hours  atorvastatin 10 milliGRAM(s) Oral at bedtime  chlorhexidine 2% Cloths 1 Application(s) Topical daily  enoxaparin Injectable 40 milliGRAM(s) SubCutaneous every 24 hours  lactobacillus acidophilus 1 Tablet(s) Oral daily  losartan 25 milliGRAM(s) Oral daily  sodium chloride 0.9%. 1000 milliLiter(s) IV Continuous <Continuous>  sodium chloride 3%  Inhalation 4 milliLiter(s) Inhalation every 12 hours      PRN Meds:  guaiFENesin Oral Liquid (Sugar-Free) 100 milliGRAM(s) Oral every 6 hours PRN  ondansetron   Disintegrating Tablet 4 milliGRAM(s) Oral every 8 hours PRN  sodium chloride 0.65% Nasal 1 Spray(s) Both Nostrils two times a day PRN      LABS:                        11.0   10.75 )-----------( 503      ( 21 May 2024 06:40 )             32.6     Hgb Trend: 11.0<--, 11.2<--, 11.7<--, 11.3<--, 11.7<--  05-21    139  |  102  |  9   ----------------------------<  105<H>  4.7   |  23  |  0.59    Ca    9.4      21 May 2024 06:43  Phos  4.7     05-21  Mg     2.4     05-21    TPro  6.7  /  Alb  3.3  /  TBili  0.4  /  DBili  x   /  AST  35  /  ALT  93<H>  /  AlkPhos  97  05-21    Creatinine Trend: 0.59<--, 0.63<--, 0.60<--, 0.61<--, 0.48<--, 0.51<--    Urinalysis Basic - ( 21 May 2024 06:43 )    Color: x / Appearance: x / SG: x / pH: x  Gluc: 105 mg/dL / Ketone: x  / Bili: x / Urobili: x   Blood: x / Protein: x / Nitrite: x   Leuk Esterase: x / RBC: x / WBC x   Sq Epi: x / Non Sq Epi: x / Bacteria: x            MICROBIOLOGY:       RADIOLOGY:  [x ] Reviewed and interpreted by me    PULMONARY FUNCTION TESTS:    EKG:

## 2024-05-21 NOTE — PROGRESS NOTE ADULT - ATTENDING COMMENTS
#Sepsis 2/2 multifocal pna and hypoxic respiratory failure.   Symptoms improving. Remains on 2L NC. Wean as tolerated. Diarrhea resolved.   -c/w ceftriaxone/azithromycin.   -CTPE neg for PE; Multifocal ground-glass and consolidative opacities and tree-in-bud nodular opacities compatible with infection.  CT chest follow-up in 3 months recommended to ensure clearing.  -continue with acapella.   -appreciate pulm input   -Wean O2 as tolerated

## 2024-05-21 NOTE — CONSULT NOTE ADULT - ASSESSMENT
58 y/o F nonsmoker w/HTN and no hx of pulmonary disease admitted for fever and dyspnea likely secondary to PNA now with persistent hypoxemia. Hypoxemia likely multifactorial including acute pulmonary edema and atelectasis.    - Supplemental O2 as needed goal O2 sat >= 90%  - Diuresis goal net negative 1-2 L daily  - Strict I/Os  - Incentive spirometry  - OOB and ambulating as tolerated  - Abx as per primary team

## 2024-05-21 NOTE — PROGRESS NOTE ADULT - SUBJECTIVE AND OBJECTIVE BOX
DATE OF SERVICE: 05-21-24 @ 07:19    Patient is a 59y old  Female who presents with a chief complaint of Pneumonia due to infectious organism     (16 May 2024 14:44)      SUBJECTIVE / OVERNIGHT EVENTS:    MEDICATIONS  (STANDING):  albuterol/ipratropium for Nebulization 3 milliLiter(s) Nebulizer every 6 hours  atorvastatin 10 milliGRAM(s) Oral at bedtime  azithromycin   Tablet 500 milliGRAM(s) Oral daily  chlorhexidine 2% Cloths 1 Application(s) Topical daily  enoxaparin Injectable 40 milliGRAM(s) SubCutaneous every 24 hours  lactobacillus acidophilus 1 Tablet(s) Oral daily  losartan 25 milliGRAM(s) Oral daily  sodium chloride 0.9%. 1000 milliLiter(s) (50 mL/Hr) IV Continuous <Continuous>  sodium chloride 3%  Inhalation 4 milliLiter(s) Inhalation every 12 hours    MEDICATIONS  (PRN):  guaiFENesin Oral Liquid (Sugar-Free) 100 milliGRAM(s) Oral every 6 hours PRN Cough  ondansetron   Disintegrating Tablet 4 milliGRAM(s) Oral every 8 hours PRN Nausea and/or Vomiting  sodium chloride 0.65% Nasal 1 Spray(s) Both Nostrils two times a day PRN dry nose      Vital Signs Last 24 Hrs  T(C): 36.8 (21 May 2024 04:56), Max: 37.3 (20 May 2024 09:00)  T(F): 98.2 (21 May 2024 04:56), Max: 99.1 (20 May 2024 09:00)  HR: 99 (21 May 2024 04:56) (67 - 116)  BP: 119/82 (21 May 2024 04:56) (106/69 - 161/74)  BP(mean): --  RR: 18 (21 May 2024 04:56) (17 - 18)  SpO2: 96% (21 May 2024 04:56) (94% - 96%)    Parameters below as of 21 May 2024 04:56  Patient On (Oxygen Delivery Method): nasal cannula  O2 Flow (L/min): 3    CAPILLARY BLOOD GLUCOSE        I&O's Summary    20 May 2024 07:01  -  21 May 2024 07:00  --------------------------------------------------------  IN: 850 mL / OUT: 1203 mL / NET: -353 mL        PHYSICAL EXAM:  GENERAL: NAD, well-developed  HEAD:  Atraumatic, Normocephalic  EYES: EOMI, PERRLA, conjunctiva and sclera clear  NECK: Supple, No JVD  CHEST/LUNG: Clear to auscultation bilaterally; No wheeze  HEART: Regular rate and rhythm; No murmurs, rubs, or gallops  ABDOMEN: Soft, Nontender, Nondistended; Bowel sounds present  EXTREMITIES:  2+ Peripheral Pulses, No clubbing, cyanosis, or edema  PSYCH: AAOx3  NEUROLOGY: non-focal  SKIN: No rashes or lesions    LABS:                        11.0   10.75 )-----------( 503      ( 21 May 2024 06:40 )             32.6     05-20    135  |  100  |  11  ----------------------------<  95  4.2   |  26  |  0.63    Ca    9.1      20 May 2024 05:53  Phos  4.3     05-20  Mg     2.3     05-20    TPro  7.1  /  Alb  3.6  /  TBili  0.4  /  DBili  x   /  AST  44<H>  /  ALT  103<H>  /  AlkPhos  103  05-20          Urinalysis Basic - ( 20 May 2024 05:53 )    Color: x / Appearance: x / SG: x / pH: x  Gluc: 95 mg/dL / Ketone: x  / Bili: x / Urobili: x   Blood: x / Protein: x / Nitrite: x   Leuk Esterase: x / RBC: x / WBC x   Sq Epi: x / Non Sq Epi: x / Bacteria: x        RADIOLOGY & ADDITIONAL TESTS:    Imaging Personally Reviewed:    Consultant(s) Notes Reviewed:      Care Discussed with Consultants/Other Providers:   DATE OF SERVICE: 05-21-24 @ 07:19    Patient is a 59y old  Female who presents with a chief complaint of Pneumonia due to infectious organism     (16 May 2024 14:44)      SUBJECTIVE / OVERNIGHT EVENTS: NAEO. Patient did not tolerate 2L overnight and went back up to 3L. Ambulating, eating and no diarrhea.    MEDICATIONS  (STANDING):  albuterol/ipratropium for Nebulization 3 milliLiter(s) Nebulizer every 6 hours  atorvastatin 10 milliGRAM(s) Oral at bedtime  azithromycin   Tablet 500 milliGRAM(s) Oral daily  chlorhexidine 2% Cloths 1 Application(s) Topical daily  enoxaparin Injectable 40 milliGRAM(s) SubCutaneous every 24 hours  lactobacillus acidophilus 1 Tablet(s) Oral daily  losartan 25 milliGRAM(s) Oral daily  sodium chloride 0.9%. 1000 milliLiter(s) (50 mL/Hr) IV Continuous <Continuous>  sodium chloride 3%  Inhalation 4 milliLiter(s) Inhalation every 12 hours    MEDICATIONS  (PRN):  guaiFENesin Oral Liquid (Sugar-Free) 100 milliGRAM(s) Oral every 6 hours PRN Cough  ondansetron   Disintegrating Tablet 4 milliGRAM(s) Oral every 8 hours PRN Nausea and/or Vomiting  sodium chloride 0.65% Nasal 1 Spray(s) Both Nostrils two times a day PRN dry nose      Vital Signs Last 24 Hrs  T(C): 36.8 (21 May 2024 04:56), Max: 37.3 (20 May 2024 09:00)  T(F): 98.2 (21 May 2024 04:56), Max: 99.1 (20 May 2024 09:00)  HR: 99 (21 May 2024 04:56) (67 - 116)  BP: 119/82 (21 May 2024 04:56) (106/69 - 161/74)  BP(mean): --  RR: 18 (21 May 2024 04:56) (17 - 18)  SpO2: 96% (21 May 2024 04:56) (94% - 96%)    Parameters below as of 21 May 2024 04:56  Patient On (Oxygen Delivery Method): nasal cannula  O2 Flow (L/min): 3    CAPILLARY BLOOD GLUCOSE        I&O's Summary    20 May 2024 07:01  -  21 May 2024 07:00  --------------------------------------------------------  IN: 850 mL / OUT: 1203 mL / NET: -353 mL        PHYSICAL EXAM:  GENERAL: NAD, lying in bed comfortably  HEAD:  Atraumatic, Normocephalic  EYES: EOMI,  conjunctiva and sclera clear  CHEST/LUNG: mild diffuse expiratory wheezes, no crackles   HEART: Regular rate and rhythm; No murmurs, rubs, or gallops  ABDOMEN: BSx4; Soft, nontender, nondistended  EXTREMITIES:  2+ Peripheral Pulses, brisk capillary refill. No cyanosis or edema  NERVOUS SYSTEM:  A&Ox3, no focal deficits   SKIN: No rashes or lesions    LABS:                        11.0   10.75 )-----------( 503      ( 21 May 2024 06:40 )             32.6     05-20    135  |  100  |  11  ----------------------------<  95  4.2   |  26  |  0.63    Ca    9.1      20 May 2024 05:53  Phos  4.3     05-20  Mg     2.3     05-20    TPro  7.1  /  Alb  3.6  /  TBili  0.4  /  DBili  x   /  AST  44<H>  /  ALT  103<H>  /  AlkPhos  103  05-20          Urinalysis Basic - ( 20 May 2024 05:53 )    Color: x / Appearance: x / SG: x / pH: x  Gluc: 95 mg/dL / Ketone: x  / Bili: x / Urobili: x   Blood: x / Protein: x / Nitrite: x   Leuk Esterase: x / RBC: x / WBC x   Sq Epi: x / Non Sq Epi: x / Bacteria: x        RADIOLOGY & ADDITIONAL TESTS:    Imaging Personally Reviewed:    Consultant(s) Notes Reviewed:      Care Discussed with Consultants/Other Providers:

## 2024-05-22 LAB
ALBUMIN SERPL ELPH-MCNC: 3.7 G/DL — SIGNIFICANT CHANGE UP (ref 3.3–5)
ALP SERPL-CCNC: 107 U/L — SIGNIFICANT CHANGE UP (ref 40–120)
ALT FLD-CCNC: 32 U/L — SIGNIFICANT CHANGE UP (ref 10–45)
ANION GAP SERPL CALC-SCNC: 14 MMOL/L — SIGNIFICANT CHANGE UP (ref 5–17)
AST SERPL-CCNC: 25 U/L — SIGNIFICANT CHANGE UP (ref 10–40)
BASOPHILS # BLD AUTO: 0 K/UL — SIGNIFICANT CHANGE UP (ref 0–0.2)
BASOPHILS NFR BLD AUTO: 0 % — SIGNIFICANT CHANGE UP (ref 0–2)
BILIRUB SERPL-MCNC: 0.5 MG/DL — SIGNIFICANT CHANGE UP (ref 0.2–1.2)
BUN SERPL-MCNC: 20 MG/DL — SIGNIFICANT CHANGE UP (ref 7–23)
CALCIUM SERPL-MCNC: 9.1 MG/DL — SIGNIFICANT CHANGE UP (ref 8.4–10.5)
CHLORIDE SERPL-SCNC: 100 MMOL/L — SIGNIFICANT CHANGE UP (ref 96–108)
CO2 SERPL-SCNC: 25 MMOL/L — SIGNIFICANT CHANGE UP (ref 22–31)
CREAT SERPL-MCNC: 0.67 MG/DL — SIGNIFICANT CHANGE UP (ref 0.5–1.3)
EGFR: 101 ML/MIN/1.73M2 — SIGNIFICANT CHANGE UP
EOSINOPHIL # BLD AUTO: 0.24 K/UL — SIGNIFICANT CHANGE UP (ref 0–0.5)
EOSINOPHIL NFR BLD AUTO: 3.5 % — SIGNIFICANT CHANGE UP (ref 0–6)
GIANT PLATELETS BLD QL SMEAR: PRESENT — SIGNIFICANT CHANGE UP
GLUCOSE SERPL-MCNC: 84 MG/DL — SIGNIFICANT CHANGE UP (ref 70–99)
HCT VFR BLD CALC: 45.2 % — HIGH (ref 34.5–45)
HGB BLD-MCNC: 14.8 G/DL — SIGNIFICANT CHANGE UP (ref 11.5–15.5)
LYMPHOCYTES # BLD AUTO: 0.3 K/UL — LOW (ref 1–3.3)
LYMPHOCYTES # BLD AUTO: 4.4 % — LOW (ref 13–44)
MAGNESIUM SERPL-MCNC: 2 MG/DL — SIGNIFICANT CHANGE UP (ref 1.6–2.6)
MANUAL SMEAR VERIFICATION: SIGNIFICANT CHANGE UP
MCHC RBC-ENTMCNC: 30.5 PG — SIGNIFICANT CHANGE UP (ref 27–34)
MCHC RBC-ENTMCNC: 32.7 GM/DL — SIGNIFICANT CHANGE UP (ref 32–36)
MCV RBC AUTO: 93.2 FL — SIGNIFICANT CHANGE UP (ref 80–100)
MONOCYTES # BLD AUTO: 0.84 K/UL — SIGNIFICANT CHANGE UP (ref 0–0.9)
MONOCYTES NFR BLD AUTO: 12.3 % — SIGNIFICANT CHANGE UP (ref 2–14)
NEUTROPHILS # BLD AUTO: 5.45 K/UL — SIGNIFICANT CHANGE UP (ref 1.8–7.4)
NEUTROPHILS NFR BLD AUTO: 79.8 % — HIGH (ref 43–77)
PHOSPHATE SERPL-MCNC: 3.6 MG/DL — SIGNIFICANT CHANGE UP (ref 2.5–4.5)
PLAT MORPH BLD: ABNORMAL
PLATELET # BLD AUTO: 228 K/UL — SIGNIFICANT CHANGE UP (ref 150–400)
POTASSIUM SERPL-MCNC: 4.1 MMOL/L — SIGNIFICANT CHANGE UP (ref 3.5–5.3)
POTASSIUM SERPL-SCNC: 4.1 MMOL/L — SIGNIFICANT CHANGE UP (ref 3.5–5.3)
PROT SERPL-MCNC: 7.1 G/DL — SIGNIFICANT CHANGE UP (ref 6–8.3)
RBC # BLD: 4.85 M/UL — SIGNIFICANT CHANGE UP (ref 3.8–5.2)
RBC # FLD: 13.6 % — SIGNIFICANT CHANGE UP (ref 10.3–14.5)
RBC BLD AUTO: SIGNIFICANT CHANGE UP
SODIUM SERPL-SCNC: 139 MMOL/L — SIGNIFICANT CHANGE UP (ref 135–145)
WBC # BLD: 6.83 K/UL — SIGNIFICANT CHANGE UP (ref 3.8–10.5)
WBC # FLD AUTO: 6.83 K/UL — SIGNIFICANT CHANGE UP (ref 3.8–10.5)

## 2024-05-22 PROCEDURE — 99232 SBSQ HOSP IP/OBS MODERATE 35: CPT | Mod: GC

## 2024-05-22 PROCEDURE — 99232 SBSQ HOSP IP/OBS MODERATE 35: CPT

## 2024-05-22 RX ORDER — FUROSEMIDE 40 MG
40 TABLET ORAL ONCE
Refills: 0 | Status: COMPLETED | OUTPATIENT
Start: 2024-05-22 | End: 2024-05-22

## 2024-05-22 RX ADMIN — Medication 100 MILLIGRAM(S): at 06:50

## 2024-05-22 RX ADMIN — ATORVASTATIN CALCIUM 10 MILLIGRAM(S): 80 TABLET, FILM COATED ORAL at 22:58

## 2024-05-22 RX ADMIN — Medication 3 MILLILITER(S): at 11:28

## 2024-05-22 RX ADMIN — CHLORHEXIDINE GLUCONATE 1 APPLICATION(S): 213 SOLUTION TOPICAL at 11:36

## 2024-05-22 RX ADMIN — Medication 3 MILLILITER(S): at 05:09

## 2024-05-22 RX ADMIN — SODIUM CHLORIDE 4 MILLILITER(S): 9 INJECTION INTRAMUSCULAR; INTRAVENOUS; SUBCUTANEOUS at 05:09

## 2024-05-22 RX ADMIN — Medication 3 MILLIGRAM(S): at 22:58

## 2024-05-22 RX ADMIN — Medication 3 MILLILITER(S): at 23:00

## 2024-05-22 RX ADMIN — SODIUM CHLORIDE 4 MILLILITER(S): 9 INJECTION INTRAMUSCULAR; INTRAVENOUS; SUBCUTANEOUS at 18:09

## 2024-05-22 RX ADMIN — Medication 1 TABLET(S): at 11:27

## 2024-05-22 RX ADMIN — ENOXAPARIN SODIUM 40 MILLIGRAM(S): 100 INJECTION SUBCUTANEOUS at 18:08

## 2024-05-22 RX ADMIN — LOSARTAN POTASSIUM 25 MILLIGRAM(S): 100 TABLET, FILM COATED ORAL at 05:09

## 2024-05-22 RX ADMIN — Medication 40 MILLIGRAM(S): at 11:27

## 2024-05-22 RX ADMIN — Medication 3 MILLILITER(S): at 18:08

## 2024-05-22 NOTE — PROGRESS NOTE ADULT - ATTENDING COMMENTS
#Sepsis 2/2 multifocal pna and hypoxic respiratory failure.   Symptoms improving. On 1L NC; wean as tolerated  s/p ctx/azitrho  gentle diuresis   CTPE neg for PE; Multifocal ground-glass and consolidative opacities and tree-in-bud nodular opacities compatible with infection.  CT chest follow-up in 3 months recommended to ensure clearing.  continue with acapella  appreciate pulm input

## 2024-05-22 NOTE — PROGRESS NOTE ADULT - PROBLEM SELECTOR PLAN 1
On 4L NC, productive cough  CURB65 0, PSI: 48 points -> grade 2  Wells score 1, BNP and trops unremarkable  Etiology: Acute Bacterial Pneumonia (legionella high possibility) vs PE. Less common AIP. Low likelihood of cardiac etiology  MRSA, urine legionella negative  ceftriaxone 1 g IV for 5 day course, completed  azithromycin 500 mg for 7 day course, completed    Plan:  -lasix 40 mg IV one time for superimposed pulmonary edema, goal net output 1-2 L negative  -duoneb 3g q6h standing for dyspnea  -hypertonic saline 3%, q12h  -CTPE for persistent oxygen needs, f/u read  -wean oxygen as tolerated  -will need ambulatory study before discharge On 4L NC, productive cough  CURB65 0, PSI: 48 points -> grade 2  Wells score 1, BNP and trops unremarkable  Etiology: Acute Bacterial Pneumonia (legionella high possibility) vs PE. Less common AIP. Low likelihood of cardiac etiology  MRSA, urine legionella negative  ceftriaxone 1 g IV for 5 day course, completed  azithromycin 500 mg for 7 day course, completed    Plan:  -lasix 40 mg IV one more time for superimposed pulmonary edema, goal net output 1-2 L negative  -duoneb 3g q6h standing for dyspnea  -hypertonic saline 3%, q12h  -CTPE for persistent oxygen needs, f/u read  -wean oxygen as tolerated  -will need ambulatory study before discharge

## 2024-05-22 NOTE — PROGRESS NOTE ADULT - SUBJECTIVE AND OBJECTIVE BOX
CHIEF COMPLAINT: Dyspnea    Interval Events: No acute events. O2 requirements improving. Breathing feels a little better. Still with occasional productive cough. No chest pain, fevers, chills, nausea, emesis, or diarrhea.     REVIEW OF SYSTEMS:  See above. ROS otherwise negative.     OBJECTIVE:  ICU Vital Signs Last 24 Hrs  T(C): 36.8 (22 May 2024 10:50), Max: 37.1 (21 May 2024 20:22)  T(F): 98.3 (22 May 2024 10:50), Max: 98.8 (21 May 2024 20:22)  HR: 106 (22 May 2024 10:50) (106 - 122)  BP: 104/68 (22 May 2024 10:50) (102/73 - 119/74)  BP(mean): --  ABP: --  ABP(mean): --  RR: 18 (22 May 2024 10:50) (18 - 18)  SpO2: 96% (22 May 2024 10:50) (89% - 97%)    O2 Parameters below as of 22 May 2024 10:50  Patient On (Oxygen Delivery Method): nasal cannula  O2 Flow (L/min): 1            05-21 @ 07:01  -  05-22 @ 07:00  --------------------------------------------------------  IN: 930 mL / OUT: 1400 mL / NET: -470 mL    05-22 @ 07:01 - 05-22 @ 12:36  --------------------------------------------------------  IN: 480 mL / OUT: 0 mL / NET: 480 mL      CAPILLARY BLOOD GLUCOSE          PHYSICAL EXAM:  General: Adult female sitting comfortably in chair, NAD  HEENT: NC/AT sclerae anicteric  Neck: Supple  Respiratory: No increased WOB, bibasilar crackles  Cardiovascular: S1, S2  Abdomen: Soft, + BS  Extremities: WWP, trace edema  Neurological: Awake, alert, follwos commands  Psychiatry: Appropriate affect    HOSPITAL MEDICATIONS:  MEDICATIONS  (STANDING):  albuterol/ipratropium for Nebulization 3 milliLiter(s) Nebulizer every 6 hours  atorvastatin 10 milliGRAM(s) Oral at bedtime  chlorhexidine 2% Cloths 1 Application(s) Topical daily  enoxaparin Injectable 40 milliGRAM(s) SubCutaneous every 24 hours  lactobacillus acidophilus 1 Tablet(s) Oral daily  losartan 25 milliGRAM(s) Oral daily  melatonin 3 milliGRAM(s) Oral at bedtime  sodium chloride 0.9%. 1000 milliLiter(s) (50 mL/Hr) IV Continuous <Continuous>  sodium chloride 3%  Inhalation 4 milliLiter(s) Inhalation every 12 hours    MEDICATIONS  (PRN):  guaiFENesin Oral Liquid (Sugar-Free) 100 milliGRAM(s) Oral every 6 hours PRN Cough  ondansetron   Disintegrating Tablet 4 milliGRAM(s) Oral every 8 hours PRN Nausea and/or Vomiting  sodium chloride 0.65% Nasal 1 Spray(s) Both Nostrils two times a day PRN dry nose      LABS:                        14.8   6.83  )-----------( 228      ( 22 May 2024 06:49 )             45.2     Hgb Trend: 14.8<--, 11.0<--, 11.2<--, 11.7<--, 11.3<--  05-22    139  |  100  |  20  ----------------------------<  84  4.1   |  25  |  0.67    Ca    9.1      22 May 2024 06:55  Phos  3.6     05-22  Mg     2.0     05-22    TPro  7.1  /  Alb  3.7  /  TBili  0.5  /  DBili  x   /  AST  25  /  ALT  32  /  AlkPhos  107  05-22    Creatinine Trend: 0.67<--, 0.59<--, 0.63<--, 0.60<--, 0.61<--, 0.48<--    Urinalysis Basic - ( 22 May 2024 06:55 )    Color: x / Appearance: x / SG: x / pH: x  Gluc: 84 mg/dL / Ketone: x  / Bili: x / Urobili: x   Blood: x / Protein: x / Nitrite: x   Leuk Esterase: x / RBC: x / WBC x   Sq Epi: x / Non Sq Epi: x / Bacteria: x            MICROBIOLOGY:       RADIOLOGY:  [ ] Reviewed and interpreted by me    PULMONARY FUNCTION TESTS:    EKG:

## 2024-05-22 NOTE — PROGRESS NOTE ADULT - SUBJECTIVE AND OBJECTIVE BOX
DATE OF SERVICE: 05-22-24 @ 07:34    Patient is a 59y old  Female who presents with a chief complaint of Pneumonia due to infectious organism     (16 May 2024 14:44)      SUBJECTIVE / OVERNIGHT EVENTS:    MEDICATIONS  (STANDING):  albuterol/ipratropium for Nebulization 3 milliLiter(s) Nebulizer every 6 hours  atorvastatin 10 milliGRAM(s) Oral at bedtime  chlorhexidine 2% Cloths 1 Application(s) Topical daily  enoxaparin Injectable 40 milliGRAM(s) SubCutaneous every 24 hours  lactobacillus acidophilus 1 Tablet(s) Oral daily  losartan 25 milliGRAM(s) Oral daily  melatonin 3 milliGRAM(s) Oral at bedtime  sodium chloride 0.9%. 1000 milliLiter(s) (50 mL/Hr) IV Continuous <Continuous>  sodium chloride 3%  Inhalation 4 milliLiter(s) Inhalation every 12 hours    MEDICATIONS  (PRN):  guaiFENesin Oral Liquid (Sugar-Free) 100 milliGRAM(s) Oral every 6 hours PRN Cough  ondansetron   Disintegrating Tablet 4 milliGRAM(s) Oral every 8 hours PRN Nausea and/or Vomiting  sodium chloride 0.65% Nasal 1 Spray(s) Both Nostrils two times a day PRN dry nose      Vital Signs Last 24 Hrs  T(C): 36.7 (22 May 2024 04:24), Max: 37.1 (21 May 2024 20:22)  T(F): 98 (22 May 2024 04:24), Max: 98.8 (21 May 2024 20:22)  HR: 112 (22 May 2024 04:24) (110 - 122)  BP: 102/73 (22 May 2024 04:24) (102/73 - 119/74)  BP(mean): --  RR: 18 (22 May 2024 04:51) (18 - 18)  SpO2: 94% (22 May 2024 04:51) (93% - 94%)    Parameters below as of 22 May 2024 04:51  Patient On (Oxygen Delivery Method): nasal cannula  O2 Flow (L/min): 1    CAPILLARY BLOOD GLUCOSE        I&O's Summary    21 May 2024 07:01  -  22 May 2024 07:00  --------------------------------------------------------  IN: 930 mL / OUT: 1400 mL / NET: -470 mL        PHYSICAL EXAM:  GENERAL: NAD, well-developed  HEAD:  Atraumatic, Normocephalic  EYES: EOMI, PERRLA, conjunctiva and sclera clear  NECK: Supple, No JVD  CHEST/LUNG: Clear to auscultation bilaterally; No wheeze  HEART: Regular rate and rhythm; No murmurs, rubs, or gallops  ABDOMEN: Soft, Nontender, Nondistended; Bowel sounds present  EXTREMITIES:  2+ Peripheral Pulses, No clubbing, cyanosis, or edema  PSYCH: AAOx3  NEUROLOGY: non-focal  SKIN: No rashes or lesions    LABS:                        14.8   6.83  )-----------( x        ( 22 May 2024 06:49 )             45.2     05-21    139  |  102  |  9   ----------------------------<  105<H>  4.7   |  23  |  0.59    Ca    9.4      21 May 2024 06:43  Phos  4.7     05-21  Mg     2.4     05-21    TPro  6.7  /  Alb  3.3  /  TBili  0.4  /  DBili  x   /  AST  35  /  ALT  93<H>  /  AlkPhos  97  05-21          Urinalysis Basic - ( 21 May 2024 06:43 )    Color: x / Appearance: x / SG: x / pH: x  Gluc: 105 mg/dL / Ketone: x  / Bili: x / Urobili: x   Blood: x / Protein: x / Nitrite: x   Leuk Esterase: x / RBC: x / WBC x   Sq Epi: x / Non Sq Epi: x / Bacteria: x        RADIOLOGY & ADDITIONAL TESTS:    Imaging Personally Reviewed:    Consultant(s) Notes Reviewed:      Care Discussed with Consultants/Other Providers:   DATE OF SERVICE: 05-22-24 @ 07:34    Patient is a 59y old  Female who presents with a chief complaint of Pneumonia due to infectious organism     (16 May 2024 14:44)      SUBJECTIVE / OVERNIGHT EVENTS: NAEO. Urinated well in response to the lasix yesterday.     MEDICATIONS  (STANDING):  albuterol/ipratropium for Nebulization 3 milliLiter(s) Nebulizer every 6 hours  atorvastatin 10 milliGRAM(s) Oral at bedtime  chlorhexidine 2% Cloths 1 Application(s) Topical daily  enoxaparin Injectable 40 milliGRAM(s) SubCutaneous every 24 hours  lactobacillus acidophilus 1 Tablet(s) Oral daily  losartan 25 milliGRAM(s) Oral daily  melatonin 3 milliGRAM(s) Oral at bedtime  sodium chloride 0.9%. 1000 milliLiter(s) (50 mL/Hr) IV Continuous <Continuous>  sodium chloride 3%  Inhalation 4 milliLiter(s) Inhalation every 12 hours    MEDICATIONS  (PRN):  guaiFENesin Oral Liquid (Sugar-Free) 100 milliGRAM(s) Oral every 6 hours PRN Cough  ondansetron   Disintegrating Tablet 4 milliGRAM(s) Oral every 8 hours PRN Nausea and/or Vomiting  sodium chloride 0.65% Nasal 1 Spray(s) Both Nostrils two times a day PRN dry nose      Vital Signs Last 24 Hrs  T(C): 36.7 (22 May 2024 04:24), Max: 37.1 (21 May 2024 20:22)  T(F): 98 (22 May 2024 04:24), Max: 98.8 (21 May 2024 20:22)  HR: 112 (22 May 2024 04:24) (110 - 122)  BP: 102/73 (22 May 2024 04:24) (102/73 - 119/74)  BP(mean): --  RR: 18 (22 May 2024 04:51) (18 - 18)  SpO2: 94% (22 May 2024 04:51) (93% - 94%)    Parameters below as of 22 May 2024 04:51  Patient On (Oxygen Delivery Method): nasal cannula  O2 Flow (L/min): 1    CAPILLARY BLOOD GLUCOSE        I&O's Summary    21 May 2024 07:01  -  22 May 2024 07:00  --------------------------------------------------------  IN: 930 mL / OUT: 1400 mL / NET: -470 mL        PHYSICAL EXAM:  GENERAL: NAD, well-developed  HEAD:  Atraumatic, Normocephalic  EYES: EOMI, PERRLA, conjunctiva and sclera clear  NECK: Supple, No JVD  CHEST/LUNG: Clear to auscultation bilaterally; No wheeze  HEART: Regular rate and rhythm; No murmurs, rubs, or gallops  ABDOMEN: Soft, Nontender, Nondistended; Bowel sounds present  EXTREMITIES:  2+ Peripheral Pulses, No clubbing, cyanosis, or edema  PSYCH: AAOx3  NEUROLOGY: non-focal  SKIN: No rashes or lesions    LABS:                        14.8   6.83  )-----------( x        ( 22 May 2024 06:49 )             45.2     05-21    139  |  102  |  9   ----------------------------<  105<H>  4.7   |  23  |  0.59    Ca    9.4      21 May 2024 06:43  Phos  4.7     05-21  Mg     2.4     05-21    TPro  6.7  /  Alb  3.3  /  TBili  0.4  /  DBili  x   /  AST  35  /  ALT  93<H>  /  AlkPhos  97  05-21          Urinalysis Basic - ( 21 May 2024 06:43 )    Color: x / Appearance: x / SG: x / pH: x  Gluc: 105 mg/dL / Ketone: x  / Bili: x / Urobili: x   Blood: x / Protein: x / Nitrite: x   Leuk Esterase: x / RBC: x / WBC x   Sq Epi: x / Non Sq Epi: x / Bacteria: x        RADIOLOGY & ADDITIONAL TESTS:    Imaging Personally Reviewed:    Consultant(s) Notes Reviewed:      Care Discussed with Consultants/Other Providers:

## 2024-05-23 LAB
ALBUMIN SERPL ELPH-MCNC: 3.4 G/DL — SIGNIFICANT CHANGE UP (ref 3.3–5)
ALP SERPL-CCNC: 92 U/L — SIGNIFICANT CHANGE UP (ref 40–120)
ALT FLD-CCNC: 89 U/L — HIGH (ref 10–45)
ANION GAP SERPL CALC-SCNC: 12 MMOL/L — SIGNIFICANT CHANGE UP (ref 5–17)
AST SERPL-CCNC: 41 U/L — HIGH (ref 10–40)
BASOPHILS # BLD AUTO: 0.16 K/UL — SIGNIFICANT CHANGE UP (ref 0–0.2)
BASOPHILS NFR BLD AUTO: 1.5 % — SIGNIFICANT CHANGE UP (ref 0–2)
BILIRUB SERPL-MCNC: 0.5 MG/DL — SIGNIFICANT CHANGE UP (ref 0.2–1.2)
BUN SERPL-MCNC: 15 MG/DL — SIGNIFICANT CHANGE UP (ref 7–23)
CALCIUM SERPL-MCNC: 9.4 MG/DL — SIGNIFICANT CHANGE UP (ref 8.4–10.5)
CHLORIDE SERPL-SCNC: 103 MMOL/L — SIGNIFICANT CHANGE UP (ref 96–108)
CO2 SERPL-SCNC: 24 MMOL/L — SIGNIFICANT CHANGE UP (ref 22–31)
CREAT SERPL-MCNC: 0.67 MG/DL — SIGNIFICANT CHANGE UP (ref 0.5–1.3)
EGFR: 101 ML/MIN/1.73M2 — SIGNIFICANT CHANGE UP
EOSINOPHIL # BLD AUTO: 0.25 K/UL — SIGNIFICANT CHANGE UP (ref 0–0.5)
EOSINOPHIL NFR BLD AUTO: 2.4 % — SIGNIFICANT CHANGE UP (ref 0–6)
GLUCOSE SERPL-MCNC: 95 MG/DL — SIGNIFICANT CHANGE UP (ref 70–99)
HCT VFR BLD CALC: 34.2 % — LOW (ref 34.5–45)
HGB BLD-MCNC: 11.5 G/DL — SIGNIFICANT CHANGE UP (ref 11.5–15.5)
IMM GRANULOCYTES NFR BLD AUTO: 1.2 % — HIGH (ref 0–0.9)
LYMPHOCYTES # BLD AUTO: 3.22 K/UL — SIGNIFICANT CHANGE UP (ref 1–3.3)
LYMPHOCYTES # BLD AUTO: 31.1 % — SIGNIFICANT CHANGE UP (ref 13–44)
MAGNESIUM SERPL-MCNC: 2.3 MG/DL — SIGNIFICANT CHANGE UP (ref 1.6–2.6)
MCHC RBC-ENTMCNC: 30.7 PG — SIGNIFICANT CHANGE UP (ref 27–34)
MCHC RBC-ENTMCNC: 33.6 GM/DL — SIGNIFICANT CHANGE UP (ref 32–36)
MCV RBC AUTO: 91.2 FL — SIGNIFICANT CHANGE UP (ref 80–100)
MONOCYTES # BLD AUTO: 0.86 K/UL — SIGNIFICANT CHANGE UP (ref 0–0.9)
MONOCYTES NFR BLD AUTO: 8.3 % — SIGNIFICANT CHANGE UP (ref 2–14)
NEUTROPHILS # BLD AUTO: 5.75 K/UL — SIGNIFICANT CHANGE UP (ref 1.8–7.4)
NEUTROPHILS NFR BLD AUTO: 55.5 % — SIGNIFICANT CHANGE UP (ref 43–77)
NRBC # BLD: 0 /100 WBCS — SIGNIFICANT CHANGE UP (ref 0–0)
PHOSPHATE SERPL-MCNC: 4.7 MG/DL — HIGH (ref 2.5–4.5)
PLATELET # BLD AUTO: 529 K/UL — HIGH (ref 150–400)
POTASSIUM SERPL-MCNC: 4.6 MMOL/L — SIGNIFICANT CHANGE UP (ref 3.5–5.3)
POTASSIUM SERPL-SCNC: 4.6 MMOL/L — SIGNIFICANT CHANGE UP (ref 3.5–5.3)
PROT SERPL-MCNC: 6.8 G/DL — SIGNIFICANT CHANGE UP (ref 6–8.3)
RBC # BLD: 3.75 M/UL — LOW (ref 3.8–5.2)
RBC # FLD: 13.3 % — SIGNIFICANT CHANGE UP (ref 10.3–14.5)
SODIUM SERPL-SCNC: 139 MMOL/L — SIGNIFICANT CHANGE UP (ref 135–145)
WBC # BLD: 10.36 K/UL — SIGNIFICANT CHANGE UP (ref 3.8–10.5)
WBC # FLD AUTO: 10.36 K/UL — SIGNIFICANT CHANGE UP (ref 3.8–10.5)

## 2024-05-23 PROCEDURE — 93970 EXTREMITY STUDY: CPT | Mod: 26

## 2024-05-23 PROCEDURE — 99232 SBSQ HOSP IP/OBS MODERATE 35: CPT

## 2024-05-23 PROCEDURE — 99232 SBSQ HOSP IP/OBS MODERATE 35: CPT | Mod: GC

## 2024-05-23 RX ORDER — METOPROLOL TARTRATE 50 MG
12.5 TABLET ORAL
Refills: 0 | Status: DISCONTINUED | OUTPATIENT
Start: 2024-05-23 | End: 2024-05-24

## 2024-05-23 RX ADMIN — Medication 12.5 MILLIGRAM(S): at 18:04

## 2024-05-23 RX ADMIN — Medication 3 MILLILITER(S): at 06:04

## 2024-05-23 RX ADMIN — Medication 100 MILLIGRAM(S): at 08:10

## 2024-05-23 RX ADMIN — Medication 12.5 MILLIGRAM(S): at 08:10

## 2024-05-23 RX ADMIN — Medication 3 MILLILITER(S): at 11:27

## 2024-05-23 RX ADMIN — ENOXAPARIN SODIUM 40 MILLIGRAM(S): 100 INJECTION SUBCUTANEOUS at 18:04

## 2024-05-23 RX ADMIN — ATORVASTATIN CALCIUM 10 MILLIGRAM(S): 80 TABLET, FILM COATED ORAL at 21:21

## 2024-05-23 RX ADMIN — SODIUM CHLORIDE 4 MILLILITER(S): 9 INJECTION INTRAMUSCULAR; INTRAVENOUS; SUBCUTANEOUS at 18:04

## 2024-05-23 RX ADMIN — Medication 100 MILLIGRAM(S): at 21:28

## 2024-05-23 RX ADMIN — Medication 3 MILLIGRAM(S): at 21:21

## 2024-05-23 RX ADMIN — CHLORHEXIDINE GLUCONATE 1 APPLICATION(S): 213 SOLUTION TOPICAL at 11:27

## 2024-05-23 RX ADMIN — SODIUM CHLORIDE 4 MILLILITER(S): 9 INJECTION INTRAMUSCULAR; INTRAVENOUS; SUBCUTANEOUS at 06:04

## 2024-05-23 RX ADMIN — Medication 3 MILLILITER(S): at 18:04

## 2024-05-23 RX ADMIN — Medication 1 TABLET(S): at 11:27

## 2024-05-23 RX ADMIN — LOSARTAN POTASSIUM 25 MILLIGRAM(S): 100 TABLET, FILM COATED ORAL at 06:04

## 2024-05-23 NOTE — PROGRESS NOTE ADULT - SUBJECTIVE AND OBJECTIVE BOX
CHIEF COMPLAINT: Dyspnea    Interval Events: No acute events. Continues to feel better. Able to walk to the bathroom without getting winded. Still occasional cough productive of small amounts of whititsh sputum. No fevers, chills, nausea, emesis, or diarrhea.      REVIEW OF SYSTEMS:  See above. ROS otherwise negative.     OBJECTIVE:  ICU Vital Signs Last 24 Hrs  T(C): 36.9 (23 May 2024 11:02), Max: 36.9 (23 May 2024 11:02)  T(F): 98.5 (23 May 2024 11:02), Max: 98.5 (23 May 2024 11:02)  HR: 98 (23 May 2024 11:02) (98 - 122)  BP: 108/67 (23 May 2024 11:02) (105/71 - 111/75)  BP(mean): --  ABP: --  ABP(mean): --  RR: 18 (23 May 2024 11:02) (18 - 18)  SpO2: 94% (23 May 2024 11:02) (91% - 98%)    O2 Parameters below as of 23 May 2024 11:02  Patient On (Oxygen Delivery Method): room air              05-22 @ 07:01  -  05-23 @ 07:00  --------------------------------------------------------  IN: 720 mL / OUT: 1100 mL / NET: -380 mL    05-23 @ 07:01  -  05-23 @ 12:14  --------------------------------------------------------  IN: 480 mL / OUT: 0 mL / NET: 480 mL      CAPILLARY BLOOD GLUCOSE          PHYSICAL EXAM:  General: Adult female sitting comfortably in chair, NAD  HEENT: NC/AT sclerae anicteric  Neck: Supple  Respiratory: No increased WOB, bibasilar crackles  Cardiovascular: S1, S2  Abdomen: Soft, + BS  Extremities: WWP, trace edema  Neurological: Awake, alert, follwos commands  Psychiatry: Appropriate affect    HOSPITAL MEDICATIONS:  MEDICATIONS  (STANDING):  albuterol/ipratropium for Nebulization 3 milliLiter(s) Nebulizer every 6 hours  atorvastatin 10 milliGRAM(s) Oral at bedtime  chlorhexidine 2% Cloths 1 Application(s) Topical daily  enoxaparin Injectable 40 milliGRAM(s) SubCutaneous every 24 hours  lactobacillus acidophilus 1 Tablet(s) Oral daily  losartan 25 milliGRAM(s) Oral daily  melatonin 3 milliGRAM(s) Oral at bedtime  metoprolol tartrate 12.5 milliGRAM(s) Oral two times a day  sodium chloride 0.9%. 1000 milliLiter(s) (50 mL/Hr) IV Continuous <Continuous>  sodium chloride 3%  Inhalation 4 milliLiter(s) Inhalation every 12 hours    MEDICATIONS  (PRN):  guaiFENesin Oral Liquid (Sugar-Free) 100 milliGRAM(s) Oral every 6 hours PRN Cough  ondansetron   Disintegrating Tablet 4 milliGRAM(s) Oral every 8 hours PRN Nausea and/or Vomiting  sodium chloride 0.65% Nasal 1 Spray(s) Both Nostrils two times a day PRN dry nose      LABS:                        11.5   10.36 )-----------( 529      ( 23 May 2024 06:24 )             34.2     Hgb Trend: 11.5<--, 14.8<--, 11.0<--, 11.2<--, 11.7<--  05-23    139  |  103  |  15  ----------------------------<  95  4.6   |  24  |  0.67    Ca    9.4      23 May 2024 06:24  Phos  4.7     05-23  Mg     2.3     05-23    TPro  6.8  /  Alb  3.4  /  TBili  0.5  /  DBili  x   /  AST  41<H>  /  ALT  89<H>  /  AlkPhos  92  05-23    Creatinine Trend: 0.67<--, 0.67<--, 0.59<--, 0.63<--, 0.60<--, 0.61<--    Urinalysis Basic - ( 23 May 2024 06:24 )    Color: x / Appearance: x / SG: x / pH: x  Gluc: 95 mg/dL / Ketone: x  / Bili: x / Urobili: x   Blood: x / Protein: x / Nitrite: x   Leuk Esterase: x / RBC: x / WBC x   Sq Epi: x / Non Sq Epi: x / Bacteria: x            MICROBIOLOGY:       RADIOLOGY:  [ ] Reviewed and interpreted by me    PULMONARY FUNCTION TESTS:    EKG:

## 2024-05-23 NOTE — PROGRESS NOTE ADULT - SUBJECTIVE AND OBJECTIVE BOX
DATE OF SERVICE: 05-23-24 @ 07:40    Patient is a 59y old  Female who presents with a chief complaint of Pneumonia due to infectious organism     (16 May 2024 14:44)      SUBJECTIVE / OVERNIGHT EVENTS:    MEDICATIONS  (STANDING):  albuterol/ipratropium for Nebulization 3 milliLiter(s) Nebulizer every 6 hours  atorvastatin 10 milliGRAM(s) Oral at bedtime  chlorhexidine 2% Cloths 1 Application(s) Topical daily  enoxaparin Injectable 40 milliGRAM(s) SubCutaneous every 24 hours  lactobacillus acidophilus 1 Tablet(s) Oral daily  losartan 25 milliGRAM(s) Oral daily  melatonin 3 milliGRAM(s) Oral at bedtime  sodium chloride 0.9%. 1000 milliLiter(s) (50 mL/Hr) IV Continuous <Continuous>  sodium chloride 3%  Inhalation 4 milliLiter(s) Inhalation every 12 hours    MEDICATIONS  (PRN):  guaiFENesin Oral Liquid (Sugar-Free) 100 milliGRAM(s) Oral every 6 hours PRN Cough  ondansetron   Disintegrating Tablet 4 milliGRAM(s) Oral every 8 hours PRN Nausea and/or Vomiting  sodium chloride 0.65% Nasal 1 Spray(s) Both Nostrils two times a day PRN dry nose      Vital Signs Last 24 Hrs  T(C): 36.6 (23 May 2024 04:52), Max: 36.8 (22 May 2024 10:50)  T(F): 97.9 (23 May 2024 04:52), Max: 98.3 (22 May 2024 10:50)  HR: 110 (23 May 2024 04:52) (103 - 122)  BP: 111/75 (23 May 2024 04:52) (104/68 - 111/75)  BP(mean): --  RR: 18 (23 May 2024 04:52) (18 - 18)  SpO2: 91% (23 May 2024 04:52) (89% - 98%)    Parameters below as of 23 May 2024 04:52  Patient On (Oxygen Delivery Method): nasal cannula  O2 Flow (L/min): 1    CAPILLARY BLOOD GLUCOSE        I&O's Summary    22 May 2024 07:01  -  23 May 2024 07:00  --------------------------------------------------------  IN: 720 mL / OUT: 1100 mL / NET: -380 mL        PHYSICAL EXAM:  GENERAL: NAD, well-developed  HEAD:  Atraumatic, Normocephalic  EYES: EOMI, PERRLA, conjunctiva and sclera clear  NECK: Supple, No JVD  CHEST/LUNG: Clear to auscultation bilaterally; No wheeze  HEART: Regular rate and rhythm; No murmurs, rubs, or gallops  ABDOMEN: Soft, Nontender, Nondistended; Bowel sounds present  EXTREMITIES:  2+ Peripheral Pulses, No clubbing, cyanosis, or edema  PSYCH: AAOx3  NEUROLOGY: non-focal  SKIN: No rashes or lesions    LABS:                        11.5   10.36 )-----------( 529      ( 23 May 2024 06:24 )             34.2     05-23    139  |  103  |  15  ----------------------------<  95  4.6   |  24  |  0.67    Ca    9.4      23 May 2024 06:24  Phos  4.7     05-23  Mg     2.3     05-23    TPro  6.8  /  Alb  3.4  /  TBili  0.5  /  DBili  x   /  AST  41<H>  /  ALT  89<H>  /  AlkPhos  92  05-23          Urinalysis Basic - ( 23 May 2024 06:24 )    Color: x / Appearance: x / SG: x / pH: x  Gluc: 95 mg/dL / Ketone: x  / Bili: x / Urobili: x   Blood: x / Protein: x / Nitrite: x   Leuk Esterase: x / RBC: x / WBC x   Sq Epi: x / Non Sq Epi: x / Bacteria: x        RADIOLOGY & ADDITIONAL TESTS:    Imaging Personally Reviewed:    Consultant(s) Notes Reviewed:      Care Discussed with Consultants/Other Providers:   DATE OF SERVICE: 05-23-24 @ 07:40    Patient is a 59y old  Female who presents with a chief complaint of Pneumonia due to infectious organism     (16 May 2024 14:44)      SUBJECTIVE / OVERNIGHT EVENTS: NAEO. Patient had a desaturation while sleeping to 91 percent and oxygen increased to 1.5L. Otherwise this morning breathing comfortably on 1L and no other events.     MEDICATIONS  (STANDING):  albuterol/ipratropium for Nebulization 3 milliLiter(s) Nebulizer every 6 hours  atorvastatin 10 milliGRAM(s) Oral at bedtime  chlorhexidine 2% Cloths 1 Application(s) Topical daily  enoxaparin Injectable 40 milliGRAM(s) SubCutaneous every 24 hours  lactobacillus acidophilus 1 Tablet(s) Oral daily  losartan 25 milliGRAM(s) Oral daily  melatonin 3 milliGRAM(s) Oral at bedtime  sodium chloride 0.9%. 1000 milliLiter(s) (50 mL/Hr) IV Continuous <Continuous>  sodium chloride 3%  Inhalation 4 milliLiter(s) Inhalation every 12 hours    MEDICATIONS  (PRN):  guaiFENesin Oral Liquid (Sugar-Free) 100 milliGRAM(s) Oral every 6 hours PRN Cough  ondansetron   Disintegrating Tablet 4 milliGRAM(s) Oral every 8 hours PRN Nausea and/or Vomiting  sodium chloride 0.65% Nasal 1 Spray(s) Both Nostrils two times a day PRN dry nose      Vital Signs Last 24 Hrs  T(C): 36.6 (23 May 2024 04:52), Max: 36.8 (22 May 2024 10:50)  T(F): 97.9 (23 May 2024 04:52), Max: 98.3 (22 May 2024 10:50)  HR: 110 (23 May 2024 04:52) (103 - 122)  BP: 111/75 (23 May 2024 04:52) (104/68 - 111/75)  BP(mean): --  RR: 18 (23 May 2024 04:52) (18 - 18)  SpO2: 91% (23 May 2024 04:52) (89% - 98%)    Parameters below as of 23 May 2024 04:52  Patient On (Oxygen Delivery Method): nasal cannula  O2 Flow (L/min): 1    CAPILLARY BLOOD GLUCOSE        I&O's Summary    22 May 2024 07:01  -  23 May 2024 07:00  --------------------------------------------------------  IN: 720 mL / OUT: 1100 mL / NET: -380 mL        PHYSICAL EXAM:  GENERAL: NAD, lying in bed comfortably  HEAD:  Atraumatic, Normocephalic  EYES: EOMI,  conjunctiva and sclera clear  CHEST/LUNG: mild diffuse expiratory wheezes, no crackles   HEART: Regular rate and rhythm; No murmurs, rubs, or gallops  ABDOMEN: BSx4; Soft, nontender, nondistended  EXTREMITIES:  2+ Peripheral Pulses, brisk capillary refill. No cyanosis or edema  NERVOUS SYSTEM:  A&Ox3, no focal deficits   SKIN: No rashes or lesions    LABS:                        11.5   10.36 )-----------( 529      ( 23 May 2024 06:24 )             34.2     05-23    139  |  103  |  15  ----------------------------<  95  4.6   |  24  |  0.67    Ca    9.4      23 May 2024 06:24  Phos  4.7     05-23  Mg     2.3     05-23    TPro  6.8  /  Alb  3.4  /  TBili  0.5  /  DBili  x   /  AST  41<H>  /  ALT  89<H>  /  AlkPhos  92  05-23          Urinalysis Basic - ( 23 May 2024 06:24 )    Color: x / Appearance: x / SG: x / pH: x  Gluc: 95 mg/dL / Ketone: x  / Bili: x / Urobili: x   Blood: x / Protein: x / Nitrite: x   Leuk Esterase: x / RBC: x / WBC x   Sq Epi: x / Non Sq Epi: x / Bacteria: x        RADIOLOGY & ADDITIONAL TESTS:    Imaging Personally Reviewed:    Consultant(s) Notes Reviewed:      Care Discussed with Consultants/Other Providers:

## 2024-05-23 NOTE — PROGRESS NOTE ADULT - ATTENDING COMMENTS
#Sepsis 2/2 multifocal pna and hypoxic respiratory failure.   Symptoms improving  s/p ctx/azitrho  s/p gentle diuresis. hold off on further lasix for now   CTPE neg for PE; Multifocal ground-glass and consolidative opacities and tree-in-bud nodular opacities compatible with infection.  CT chest follow-up in 3 months recommended to ensure clearing.  continue with acapella  appreciate pulm input.   Dispo - wean O2, may need arrangement of home O2

## 2024-05-23 NOTE — PROGRESS NOTE ADULT - PROBLEM SELECTOR PLAN 1
On 4L NC, productive cough  CURB65 0, PSI: 48 points -> grade 2  Wells score 1, BNP and trops unremarkable  Etiology: Acute Bacterial Pneumonia (legionella high possibility) vs PE. Less common AIP. Low likelihood of cardiac etiology  MRSA, urine legionella negative  ceftriaxone 1 g IV for 5 day course, completed  azithromycin 500 mg for 7 day course, completed    Plan:  -lasix 40 mg IV one more time for superimposed pulmonary edema, goal net output 1-2 L negative  -duoneb 3g q6h standing for dyspnea  -hypertonic saline 3%, q12h  -CTPE for persistent oxygen needs, f/u read  -wean oxygen as tolerated  -will need ambulatory study before discharge On 4L NC, productive cough  CURB65 0, PSI: 48 points -> grade 2  Wells score 1, BNP and trops unremarkable  Etiology: Acute Bacterial Pneumonia (legionella high possibility) vs PE. Less common AIP. Low likelihood of cardiac etiology  MRSA, urine legionella negative  ceftriaxone 1 g IV for 5 day course, completed  azithromycin 500 mg for 7 day course, completed    Plan:  -discharge tomorrow after overnight monitoring, off oxygen  -duoneb 3g q6h standing for dyspnea  -hypertonic saline 3%, q12h  -wean oxygen as tolerated, now on room air

## 2024-05-23 NOTE — PROGRESS NOTE ADULT - TIME BILLING
reviewing documentation/labs/imaging, interviewing and examining patient, documentation, coordinating care with ACP/CM/Specialists

## 2024-05-24 ENCOUNTER — TRANSCRIPTION ENCOUNTER (OUTPATIENT)
Age: 59
End: 2024-05-24

## 2024-05-24 VITALS
OXYGEN SATURATION: 96 % | DIASTOLIC BLOOD PRESSURE: 68 MMHG | SYSTOLIC BLOOD PRESSURE: 112 MMHG | TEMPERATURE: 98 F | HEART RATE: 98 BPM | RESPIRATION RATE: 18 BRPM

## 2024-05-24 LAB
ALBUMIN SERPL ELPH-MCNC: 3.6 G/DL — SIGNIFICANT CHANGE UP (ref 3.3–5)
ALP SERPL-CCNC: 104 U/L — SIGNIFICANT CHANGE UP (ref 40–120)
ALT FLD-CCNC: 96 U/L — HIGH (ref 10–45)
ANION GAP SERPL CALC-SCNC: 12 MMOL/L — SIGNIFICANT CHANGE UP (ref 5–17)
AST SERPL-CCNC: 44 U/L — HIGH (ref 10–40)
BASOPHILS # BLD AUTO: 0.1 K/UL — SIGNIFICANT CHANGE UP (ref 0–0.2)
BASOPHILS NFR BLD AUTO: 1.2 % — SIGNIFICANT CHANGE UP (ref 0–2)
BILIRUB SERPL-MCNC: 0.3 MG/DL — SIGNIFICANT CHANGE UP (ref 0.2–1.2)
BUN SERPL-MCNC: 17 MG/DL — SIGNIFICANT CHANGE UP (ref 7–23)
CALCIUM SERPL-MCNC: 9.5 MG/DL — SIGNIFICANT CHANGE UP (ref 8.4–10.5)
CHLORIDE SERPL-SCNC: 103 MMOL/L — SIGNIFICANT CHANGE UP (ref 96–108)
CO2 SERPL-SCNC: 25 MMOL/L — SIGNIFICANT CHANGE UP (ref 22–31)
CREAT SERPL-MCNC: 0.83 MG/DL — SIGNIFICANT CHANGE UP (ref 0.5–1.3)
CULTURE RESULTS: SIGNIFICANT CHANGE UP
EGFR: 82 ML/MIN/1.73M2 — SIGNIFICANT CHANGE UP
EOSINOPHIL # BLD AUTO: 0.22 K/UL — SIGNIFICANT CHANGE UP (ref 0–0.5)
EOSINOPHIL NFR BLD AUTO: 2.6 % — SIGNIFICANT CHANGE UP (ref 0–6)
GLUCOSE SERPL-MCNC: 97 MG/DL — SIGNIFICANT CHANGE UP (ref 70–99)
HCT VFR BLD CALC: 34.4 % — LOW (ref 34.5–45)
HGB BLD-MCNC: 11.3 G/DL — LOW (ref 11.5–15.5)
IMM GRANULOCYTES NFR BLD AUTO: 0.8 % — SIGNIFICANT CHANGE UP (ref 0–0.9)
LYMPHOCYTES # BLD AUTO: 3.03 K/UL — SIGNIFICANT CHANGE UP (ref 1–3.3)
LYMPHOCYTES # BLD AUTO: 35.6 % — SIGNIFICANT CHANGE UP (ref 13–44)
MAGNESIUM SERPL-MCNC: 2.4 MG/DL — SIGNIFICANT CHANGE UP (ref 1.6–2.6)
MCHC RBC-ENTMCNC: 30.5 PG — SIGNIFICANT CHANGE UP (ref 27–34)
MCHC RBC-ENTMCNC: 32.8 GM/DL — SIGNIFICANT CHANGE UP (ref 32–36)
MCV RBC AUTO: 93 FL — SIGNIFICANT CHANGE UP (ref 80–100)
MONOCYTES # BLD AUTO: 0.77 K/UL — SIGNIFICANT CHANGE UP (ref 0–0.9)
MONOCYTES NFR BLD AUTO: 9.1 % — SIGNIFICANT CHANGE UP (ref 2–14)
NEUTROPHILS # BLD AUTO: 4.31 K/UL — SIGNIFICANT CHANGE UP (ref 1.8–7.4)
NEUTROPHILS NFR BLD AUTO: 50.7 % — SIGNIFICANT CHANGE UP (ref 43–77)
NRBC # BLD: 0 /100 WBCS — SIGNIFICANT CHANGE UP (ref 0–0)
PHOSPHATE SERPL-MCNC: 4.5 MG/DL — SIGNIFICANT CHANGE UP (ref 2.5–4.5)
PLATELET # BLD AUTO: 515 K/UL — HIGH (ref 150–400)
POTASSIUM SERPL-MCNC: 4.6 MMOL/L — SIGNIFICANT CHANGE UP (ref 3.5–5.3)
POTASSIUM SERPL-SCNC: 4.6 MMOL/L — SIGNIFICANT CHANGE UP (ref 3.5–5.3)
PROT SERPL-MCNC: 6.9 G/DL — SIGNIFICANT CHANGE UP (ref 6–8.3)
RBC # BLD: 3.7 M/UL — LOW (ref 3.8–5.2)
RBC # FLD: 13.3 % — SIGNIFICANT CHANGE UP (ref 10.3–14.5)
SODIUM SERPL-SCNC: 140 MMOL/L — SIGNIFICANT CHANGE UP (ref 135–145)
SPECIMEN SOURCE: SIGNIFICANT CHANGE UP
WBC # BLD: 8.5 K/UL — SIGNIFICANT CHANGE UP (ref 3.8–10.5)
WBC # FLD AUTO: 8.5 K/UL — SIGNIFICANT CHANGE UP (ref 3.8–10.5)

## 2024-05-24 PROCEDURE — 85730 THROMBOPLASTIN TIME PARTIAL: CPT

## 2024-05-24 PROCEDURE — 93005 ELECTROCARDIOGRAM TRACING: CPT

## 2024-05-24 PROCEDURE — 96374 THER/PROPH/DIAG INJ IV PUSH: CPT

## 2024-05-24 PROCEDURE — 87640 STAPH A DNA AMP PROBE: CPT

## 2024-05-24 PROCEDURE — 85610 PROTHROMBIN TIME: CPT

## 2024-05-24 PROCEDURE — 86664 EPSTEIN-BARR NUCLEAR ANTIGEN: CPT

## 2024-05-24 PROCEDURE — 84295 ASSAY OF SERUM SODIUM: CPT

## 2024-05-24 PROCEDURE — 93970 EXTREMITY STUDY: CPT

## 2024-05-24 PROCEDURE — 83930 ASSAY OF BLOOD OSMOLALITY: CPT

## 2024-05-24 PROCEDURE — 82435 ASSAY OF BLOOD CHLORIDE: CPT

## 2024-05-24 PROCEDURE — 85014 HEMATOCRIT: CPT

## 2024-05-24 PROCEDURE — 83880 ASSAY OF NATRIURETIC PEPTIDE: CPT

## 2024-05-24 PROCEDURE — 81001 URINALYSIS AUTO W/SCOPE: CPT

## 2024-05-24 PROCEDURE — 82803 BLOOD GASES ANY COMBINATION: CPT

## 2024-05-24 PROCEDURE — 83935 ASSAY OF URINE OSMOLALITY: CPT

## 2024-05-24 PROCEDURE — 87899 AGENT NOS ASSAY W/OPTIC: CPT

## 2024-05-24 PROCEDURE — 85018 HEMOGLOBIN: CPT

## 2024-05-24 PROCEDURE — 82947 ASSAY GLUCOSE BLOOD QUANT: CPT

## 2024-05-24 PROCEDURE — 83735 ASSAY OF MAGNESIUM: CPT

## 2024-05-24 PROCEDURE — 71275 CT ANGIOGRAPHY CHEST: CPT | Mod: MC

## 2024-05-24 PROCEDURE — 84484 ASSAY OF TROPONIN QUANT: CPT

## 2024-05-24 PROCEDURE — 87040 BLOOD CULTURE FOR BACTERIA: CPT

## 2024-05-24 PROCEDURE — 87081 CULTURE SCREEN ONLY: CPT

## 2024-05-24 PROCEDURE — 82330 ASSAY OF CALCIUM: CPT

## 2024-05-24 PROCEDURE — 80053 COMPREHEN METABOLIC PANEL: CPT

## 2024-05-24 PROCEDURE — 80048 BASIC METABOLIC PNL TOTAL CA: CPT

## 2024-05-24 PROCEDURE — 84100 ASSAY OF PHOSPHORUS: CPT

## 2024-05-24 PROCEDURE — 87641 MR-STAPH DNA AMP PROBE: CPT

## 2024-05-24 PROCEDURE — 36415 COLL VENOUS BLD VENIPUNCTURE: CPT

## 2024-05-24 PROCEDURE — 84300 ASSAY OF URINE SODIUM: CPT

## 2024-05-24 PROCEDURE — 71046 X-RAY EXAM CHEST 2 VIEWS: CPT

## 2024-05-24 PROCEDURE — 84145 PROCALCITONIN (PCT): CPT

## 2024-05-24 PROCEDURE — 84132 ASSAY OF SERUM POTASSIUM: CPT

## 2024-05-24 PROCEDURE — 86665 EPSTEIN-BARR CAPSID VCA: CPT

## 2024-05-24 PROCEDURE — 99239 HOSP IP/OBS DSCHRG MGMT >30: CPT | Mod: GC

## 2024-05-24 PROCEDURE — 86663 EPSTEIN-BARR ANTIBODY: CPT

## 2024-05-24 PROCEDURE — 83605 ASSAY OF LACTIC ACID: CPT

## 2024-05-24 PROCEDURE — 87637 SARSCOV2&INF A&B&RSV AMP PRB: CPT

## 2024-05-24 PROCEDURE — 87449 NOS EACH ORGANISM AG IA: CPT

## 2024-05-24 PROCEDURE — 85025 COMPLETE CBC W/AUTO DIFF WBC: CPT

## 2024-05-24 PROCEDURE — 99285 EMERGENCY DEPT VISIT HI MDM: CPT

## 2024-05-24 PROCEDURE — 94640 AIRWAY INHALATION TREATMENT: CPT

## 2024-05-24 RX ORDER — LOSARTAN POTASSIUM 100 MG/1
1 TABLET, FILM COATED ORAL
Refills: 0 | DISCHARGE

## 2024-05-24 RX ORDER — METOPROLOL TARTRATE 50 MG
1 TABLET ORAL
Qty: 30 | Refills: 0
Start: 2024-05-24 | End: 2024-06-22

## 2024-05-24 RX ORDER — ATENOLOL 25 MG/1
1 TABLET ORAL
Refills: 0 | DISCHARGE

## 2024-05-24 RX ADMIN — Medication 3 MILLILITER(S): at 00:01

## 2024-05-24 RX ADMIN — Medication 3 MILLILITER(S): at 05:23

## 2024-05-24 RX ADMIN — CHLORHEXIDINE GLUCONATE 1 APPLICATION(S): 213 SOLUTION TOPICAL at 11:13

## 2024-05-24 RX ADMIN — Medication 3 MILLILITER(S): at 11:12

## 2024-05-24 RX ADMIN — Medication 1 TABLET(S): at 11:13

## 2024-05-24 RX ADMIN — Medication 100 MILLIGRAM(S): at 05:23

## 2024-05-24 RX ADMIN — LOSARTAN POTASSIUM 25 MILLIGRAM(S): 100 TABLET, FILM COATED ORAL at 05:23

## 2024-05-24 RX ADMIN — Medication 12.5 MILLIGRAM(S): at 05:23

## 2024-05-24 RX ADMIN — SODIUM CHLORIDE 4 MILLILITER(S): 9 INJECTION INTRAMUSCULAR; INTRAVENOUS; SUBCUTANEOUS at 05:23

## 2024-05-24 NOTE — PROVIDER CONTACT NOTE (OTHER) - SITUATION
Pt febrile with 102 fever.
Pt oxygen saturation 88-89% while sleeping, occasionally will go up to 90%
room air at rest: 95%  room air with amb: 93-94%
Pt has new +1 pitting edema b/l lower extremities
pt has suspected DTI to sacrum
patient vomited s/t hacking cough

## 2024-05-24 NOTE — PROGRESS NOTE ADULT - ASSESSMENT
58 y/o F nonsmoker w/HTN and no hx of pulmonary disease admitted for fever and dyspnea likely secondary to PNA now with persistent hypoxemia. Hypoxemia likely multifactorial including acute pulmonary edema and atelectasis.    - Supplemental O2 as needed goal O2 sat >= 90%  - Diuresis goal net negative 1-2 L daily  - Strict I/Os  - Incentive spirometry  - OOB and ambulating as tolerated  - Abx as per primary team  
Martha is a 59F with HTN who present to the hospital for 10 days of fever and dyspnea and admitted for acute hypoxemic respiratory failure with suspected CAP. 
60 y/o F nonsmoker w/HTN and no hx of pulmonary disease admitted for fever and dyspnea likely secondary to PNA now with persistent hypoxemia. Hypoxemia likely multifactorial including acute pulmonary edema and atelectasis.    - Supplemental O2 as needed goal O2 sat >= 90%  - Diuresis goal net negative   - Incentive spirometry  - OOB and ambulating as tolerated  - Completed course of abx
Martha is a 59F with HTN who present to the hospital for 10 days of fever and dyspnea and admitted for acute hypoxemic respiratory failure with suspected CAP. 

## 2024-05-24 NOTE — PROGRESS NOTE ADULT - NUTRITIONAL ASSESSMENT
This patient has been assessed with a concern for Malnutrition and has been determined to have a diagnosis/diagnoses of Severe protein-calorie malnutrition.    This patient is being managed with:   Diet Regular-  Prosource Gelatein Plus     Qty per Day:  1  Entered: May 18 2024 10:22AM    The following pending diet order is being considered for treatment of Severe protein-calorie malnutrition:  Diet Regular-  Entered: May 20 2024 11:41AM    Diet Regular-  1500mL Fluid Restriction ([id number])  Prosource Gelatein Plus     Qty per Day:  1  Entered: May 16 2024  3:20PM  
This patient has been assessed with a concern for Malnutrition and has been determined to have a diagnosis/diagnoses of Severe protein-calorie malnutrition.    This patient is being managed with:   Diet Regular-  Prosource Gelatein Plus     Qty per Day:  1  Entered: May 18 2024 10:22AM    The following pending diet order is being considered for treatment of Severe protein-calorie malnutrition:  Diet Regular-  Entered: May 20 2024 11:41AM    Diet Regular-  1500mL Fluid Restriction ([id number])  Prosource Gelatein Plus     Qty per Day:  1  Entered: May 16 2024  3:20PM  
This patient has been assessed with a concern for Malnutrition and has been determined to have a diagnosis/diagnoses of Severe protein-calorie malnutrition.    This patient is being managed with:   Diet Regular-  Prosource Gelatein Plus     Qty per Day:  1  Entered: May 18 2024 10:22AM    The following pending diet order is being considered for treatment of Severe protein-calorie malnutrition:  Diet Regular-  Entered: May 20 2024 11:41AM    Diet Regular-  1500mL Fluid Restriction ([id number])  Prosource Gelatein Plus     Qty per Day:  1  Entered: May 16 2024  3:20PM  
This patient has been assessed with a concern for Malnutrition and has been determined to have a diagnosis/diagnoses of Severe protein-calorie malnutrition.    This patient is being managed with:   Diet Regular-  Prosource Gelatein Plus     Qty per Day:  1  Entered: May 18 2024 10:22AM    The following pending diet order is being considered for treatment of Severe protein-calorie malnutrition:  Diet Regular-  Entered: May 20 2024 11:41AM    Diet Regular-  1500mL Fluid Restriction ([id number])  Prosource Gelatein Plus     Qty per Day:  1  Entered: May 16 2024  3:20PM

## 2024-05-24 NOTE — PROGRESS NOTE ADULT - PROBLEM SELECTOR PROBLEM 2
Transaminitis
Transaminitis
Hyponatremia
Transaminitis
Transaminitis
Hyponatremia
Hyponatremia
Transaminitis
Transaminitis

## 2024-05-24 NOTE — PROVIDER CONTACT NOTE (OTHER) - RECOMMENDATIONS
Notify NAEL Flores.
Provider made aware. Ordered sodium saline nebulizer
provider notified and aware
ACP Atsumi Kimura notified and aware.
Ice packs placed on patient.

## 2024-05-24 NOTE — DISCHARGE NOTE NURSING/CASE MANAGEMENT/SOCIAL WORK - PATIENT PORTAL LINK FT
You can access the FollowMyHealth Patient Portal offered by [facility] by registering at the following website: [email or web address]. By joining [facility]’s FollowMyHealth portal, you will also be able to view your health information using other applications (apps) compatible with our system.

## 2024-05-24 NOTE — PROGRESS NOTE ADULT - PROVIDER SPECIALTY LIST ADULT
Internal Medicine
Pulmonology
Internal Medicine
Internal Medicine
Pulmonology
Internal Medicine

## 2024-05-24 NOTE — PROVIDER CONTACT NOTE (OTHER) - BACKGROUND
59 year old female, admitted for PNA due to infectious organism.
59 year old female, admitted for PNA due to infectious organism.
Pt A&Ox4, able to make needs known. VSS. No s/s of bleeding. Pt denies cp, denies SOB, denies pain. Pt denies numbness/tingling.
pt admitted for PNA
Pt admitted for pneumonia.
admitted for SOB

## 2024-05-24 NOTE — PROGRESS NOTE ADULT - PROBLEM SELECTOR PLAN 1
On 4L NC, productive cough  CURB65 0, PSI: 48 points -> grade 2  Wells score 1, BNP and trops unremarkable  Etiology: Acute Bacterial Pneumonia (legionella high possibility) vs PE. Less common AIP. Low likelihood of cardiac etiology  MRSA, urine legionella negative  ceftriaxone 1 g IV for 5 day course, completed  azithromycin 500 mg for 7 day course, completed    Plan:  -discharge off oxygen  -duoneb 3g q6h standing for dyspnea  -wean oxygen as tolerated, now on room air

## 2024-05-24 NOTE — PROGRESS NOTE ADULT - ATTENDING COMMENTS
#Sepsis 2/2 multifocal pna and hypoxic respiratory failure.   Symptoms improving  s/p ctx/azitrho  s/p gentle diuresis. hold off on further lasix for now   CTPE neg for PE; Multifocal ground-glass and consolidative opacities and tree-in-bud nodular opacities compatible with infection.  CT chest follow-up in 3 months recommended to ensure clearing.  continue with acapella  appreciate pulm input. No further inpt w/u at this time   Now weaned to RA. Will f/u outpt w/ pcp, cards, and pulm

## 2024-05-24 NOTE — DISCHARGE NOTE NURSING/CASE MANAGEMENT/SOCIAL WORK - NSDCFUADDAPPT_GEN_ALL_CORE_FT
APPTS ARE READY TO BE MADE: [X ] YES    Best Family or Patient Contact (if needed):    Additional Information about above appointments (if needed):    1: PCP James Pierce  2: Pulm-Home Tele-visit   3: Cardiology    Other comments or requests:

## 2024-05-24 NOTE — PROVIDER CONTACT NOTE (OTHER) - REASON
patient vomited s/t hacking cough
pt has suspected DTI to sacrum
102 Fever
room air at rest: 95%, room air with amb: 93-94%.
Pt oxygen saturation 88-89% while sleeping, occasionally will go up to 90%
Pt has new +1 pitting edema b/l lower extremities

## 2024-05-24 NOTE — PROGRESS NOTE ADULT - SUBJECTIVE AND OBJECTIVE BOX
DATE OF SERVICE: 05-24-24 @ 13:39    Patient is a 59y old  Female who presents with a chief complaint of Pneumonia due to infectious organism     (16 May 2024 14:44)      SUBJECTIVE / OVERNIGHT EVENTS: NAEO. Oxygen went to 89% overnight. In daytime oxygen to 93-95%. Breathing comfortably    MEDICATIONS  (STANDING):  albuterol/ipratropium for Nebulization 3 milliLiter(s) Nebulizer every 6 hours  atorvastatin 10 milliGRAM(s) Oral at bedtime  chlorhexidine 2% Cloths 1 Application(s) Topical daily  enoxaparin Injectable 40 milliGRAM(s) SubCutaneous every 24 hours  lactobacillus acidophilus 1 Tablet(s) Oral daily  losartan 25 milliGRAM(s) Oral daily  melatonin 3 milliGRAM(s) Oral at bedtime  metoprolol tartrate 12.5 milliGRAM(s) Oral two times a day  sodium chloride 0.9%. 1000 milliLiter(s) (50 mL/Hr) IV Continuous <Continuous>  sodium chloride 3%  Inhalation 4 milliLiter(s) Inhalation every 12 hours    MEDICATIONS  (PRN):  guaiFENesin Oral Liquid (Sugar-Free) 100 milliGRAM(s) Oral every 6 hours PRN Cough  ondansetron   Disintegrating Tablet 4 milliGRAM(s) Oral every 8 hours PRN Nausea and/or Vomiting  sodium chloride 0.65% Nasal 1 Spray(s) Both Nostrils two times a day PRN dry nose      Vital Signs Last 24 Hrs  T(C): 36.6 (24 May 2024 10:55), Max: 36.9 (23 May 2024 20:42)  T(F): 97.8 (24 May 2024 10:55), Max: 98.5 (23 May 2024 20:42)  HR: 98 (24 May 2024 10:55) (70 - 112)  BP: 112/68 (24 May 2024 10:55) (95/65 - 116/73)  BP(mean): --  RR: 18 (24 May 2024 10:55) (18 - 18)  SpO2: 96% (24 May 2024 10:55) (91% - 96%)    Parameters below as of 24 May 2024 10:55  Patient On (Oxygen Delivery Method): room air      CAPILLARY BLOOD GLUCOSE        I&O's Summary    23 May 2024 07:01  -  24 May 2024 07:00  --------------------------------------------------------  IN: 480 mL / OUT: 700 mL / NET: -220 mL    24 May 2024 07:01  -  24 May 2024 13:39  --------------------------------------------------------  IN: 480 mL / OUT: 0 mL / NET: 480 mL        PHYSICAL EXAM:  GENERAL: NAD, well-developed  HEAD:  Atraumatic, Normocephalic  EYES: EOMI, conjunctiva and sclera clear  CHEST/LUNG: Clear to auscultation bilaterally; No wheeze  HEART: Regular rate and rhythm; No murmurs, rubs, or gallops  ABDOMEN: Soft, Nontender, Nondistended; Bowel sounds present  EXTREMITIES:  2+ Peripheral Pulses, No clubbing, cyanosis, or edema  PSYCH: AAOx3  NEUROLOGY: non-focal  SKIN: No rashes or lesions    LABS:                        11.3   8.50  )-----------( 515      ( 24 May 2024 06:00 )             34.4     05-24    140  |  103  |  17  ----------------------------<  97  4.6   |  25  |  0.83    Ca    9.5      24 May 2024 06:00  Phos  4.5     05-24  Mg     2.4     05-24    TPro  6.9  /  Alb  3.6  /  TBili  0.3  /  DBili  x   /  AST  44<H>  /  ALT  96<H>  /  AlkPhos  104  05-24          Urinalysis Basic - ( 24 May 2024 06:00 )    Color: x / Appearance: x / SG: x / pH: x  Gluc: 97 mg/dL / Ketone: x  / Bili: x / Urobili: x   Blood: x / Protein: x / Nitrite: x   Leuk Esterase: x / RBC: x / WBC x   Sq Epi: x / Non Sq Epi: x / Bacteria: x        RADIOLOGY & ADDITIONAL TESTS:    Imaging Personally Reviewed:    Consultant(s) Notes Reviewed:      Care Discussed with Consultants/Other Providers:

## 2024-05-24 NOTE — PROVIDER CONTACT NOTE (OTHER) - ACTION/TREATMENT ORDERED:
NAEL Flores notified.
wound consult and RD order placed
ACP Atsumi Kimura notified. Patient safety maintained.
Provider made aware. admin sodium saline nebulizer
Arabella Lo made aware. Plan of care ongoing.
Provider made aware; IV Tylenol ordered.

## 2024-05-24 NOTE — PROGRESS NOTE ADULT - PROBLEM SELECTOR PLAN 6
Diet: regular  DVT: lovenox  Code: Full

## 2024-05-24 NOTE — PROVIDER CONTACT NOTE (OTHER) - ASSESSMENT
A&Ox4. vitals as per flowsheet. Patient not in distress, continuing to cough with white-yellow phelgm production.. Vomited light brown post lunch meal. Patient states she may have also aspirated some vomit while coughing. Not in distress. O2 sat 94% on 3LNC
Pt. AOx4. No c/o SOB. room air at rest: 95%, room air with amb: 93-94%.
Pt AOx4. Pt on 4L NC. Pt with a fever of 102.
Pt admitted for fever and dyspnea, diagnosed with pneumonia. PMHx HLD, HTN.
Pt. AOx4. No c/o SOB. While awake, O2 saturation at 96%.
patient VSS, A&O X 4 resting in bed

## 2024-05-24 NOTE — PROGRESS NOTE ADULT - PROBLEM SELECTOR PROBLEM 1
Sepsis with acute hypoxic respiratory failure

## 2024-05-24 NOTE — PROVIDER CONTACT NOTE (OTHER) - DATE AND TIME:
16-May-2024 00:18
16-May-2024 05:56
24-May-2024 02:06
23-May-2024 14:16
19-May-2024 16:25
20-May-2024 13:05

## 2024-05-28 ENCOUNTER — APPOINTMENT (OUTPATIENT)
Dept: PULMONOLOGY | Facility: CLINIC | Age: 59
End: 2024-05-28
Payer: COMMERCIAL

## 2024-05-28 DIAGNOSIS — G47.39 OTHER SLEEP APNEA: ICD-10-CM

## 2024-05-28 DIAGNOSIS — R05.8 OTHER SPECIFIED COUGH: ICD-10-CM

## 2024-05-28 PROBLEM — E78.5 HYPERLIPIDEMIA, UNSPECIFIED: Chronic | Status: ACTIVE | Noted: 2024-05-15

## 2024-05-28 PROBLEM — I10 ESSENTIAL (PRIMARY) HYPERTENSION: Chronic | Status: ACTIVE | Noted: 2024-05-15

## 2024-05-28 PROCEDURE — 99495 TRANSJ CARE MGMT MOD F2F 14D: CPT

## 2024-05-29 PROBLEM — R05.8 PRODUCTIVE COUGH: Status: ACTIVE | Noted: 2024-05-29

## 2024-05-29 PROBLEM — G47.39 SLEEP APNEA-LIKE BEHAVIOR: Status: ACTIVE | Noted: 2024-05-29

## 2024-05-29 RX ORDER — IPRATROPIUM BROMIDE AND ALBUTEROL SULFATE 2.5; .5 MG/3ML; MG/3ML
0.5-2.5 (3) SOLUTION RESPIRATORY (INHALATION)
Refills: 0 | Status: ACTIVE | COMMUNITY

## 2024-05-29 NOTE — ASSESSMENT
[FreeTextEntry1] : -All medication reviewed and reconciled with patient. continue as prescribed -Continue use of  DuoNebs PRN with acapella and incentive spirometer -Does not currently require use of o2, is periodically checking oxygen saturations -F/U Chest CT scan in 3 months -Continue Robitussin for residual cough -F/U with pulmonary of sleep study -Scheduled F/U with PCP Friday -Scheduled F/U with Cardiology; reintroduction of cardiac medications and management -Advised to call office immediately with any change or worsening of symptoms.

## 2024-05-29 NOTE — HISTORY OF PRESENT ILLNESS
[[place]] : Post-hospitalization from [facility] [Pneumonia] : Pneumonia [Home] : at home, [unfilled] , at the time of the visit. [Medical Office: ([facility] [place])___] : at the medical office located in  [Verbal consent obtained from patient] : the patient, [unfilled] [Yes] : Yes [Admitted on: ___] : The patient was admitted on [unfilled] [Discharged on ___] : discharged on [unfilled] [Discharge Summary] : discharge summary [Pertinent Labs] : pertinent labs [Radiology Findings] : radiology findings [Discharge Med List] : discharge medication list [Med Reconciliation] : medication reconciliation has been completed [Patient Contacted By: ____] : and contacted by [unfilled] [FreeTextEntry2] : Patient presents via tele-visit for post-hospitalization F/U.  Patient is a 59F with HTN who present to the hospital for 10 days of fever and dyspnea per request by her PCP. The fever and chills happened first and then was accompanied by a productive cough, congestion, nausea and diarrhea and poor happetite. As the days went on, she became dyspneic, especially on exertion. She saw urgent care on 05/11 where she got an xray and augmentin. On 05/14, she saw her PCP where she got another xray and bloodwork. Due to abnormal labs, chest xray findings and lack of resolution of symptoms her PCP recommended going to the emergency room for further management.  During hospitalization, xray of the chest showed reticular opacities. With the imaging findings, fevers, cough, high oxygen needs, diarrhea, and low sodium, treated for pneumonia with concern for legionella. The tests were negative for legionella, placed on 7- day course of azithromycin and 5-day course of ceftriaxone. With nebulized DuoNebs., Acappella, incentive spirometer, and o2 4L NC. With dirrhea,  sodium was 123 and placed on salt tabs and fluid restriction. CTPE was ordered which was negative for pulmonary embolism.   Upon today's visit, patient is doing well overall report some residual cough which she is using Robitussin, is helping her cough. Currently denies SOB, wheezing, fever, chills, chest tightness, hoarseness, change in voice, or rhinorrhea. With no known history of childhood asthma, eczema, frequent URI, history of PNA, or intubation. With observed snoring during hospitalization, will F/U sleep study.  Has never smoked, with no pets, no occupational or environmental exposures.

## 2024-05-29 NOTE — PHYSICAL EXAM
[No Acute Distress] : no acute distress [Well Nourished] : well nourished [Well Developed] : well developed [Normal Sclera/Conjunctiva] : normal sclera/conjunctiva [No Respiratory Distress] : no respiratory distress  [No Accessory Muscle Use] : no accessory muscle use [Coordination Grossly Intact] : coordination grossly intact [No Focal Deficits] : no focal deficits [Normal Affect] : the affect was normal [Normal Insight/Judgement] : insight and judgment were intact

## 2024-06-12 ENCOUNTER — APPOINTMENT (OUTPATIENT)
Dept: PULMONOLOGY | Facility: CLINIC | Age: 59
End: 2024-06-12
Payer: COMMERCIAL

## 2024-06-12 VITALS
HEART RATE: 102 BPM | TEMPERATURE: 98.2 F | RESPIRATION RATE: 18 BRPM | OXYGEN SATURATION: 97 % | HEIGHT: 64 IN | SYSTOLIC BLOOD PRESSURE: 146 MMHG | BODY MASS INDEX: 27.57 KG/M2 | DIASTOLIC BLOOD PRESSURE: 102 MMHG | WEIGHT: 161.5 LBS

## 2024-06-12 DIAGNOSIS — R05.9 COUGH, UNSPECIFIED: ICD-10-CM

## 2024-06-12 DIAGNOSIS — J18.9 PNEUMONIA, UNSPECIFIED ORGANISM: ICD-10-CM

## 2024-06-12 PROCEDURE — 99204 OFFICE O/P NEW MOD 45 MIN: CPT

## 2024-06-12 PROCEDURE — 99214 OFFICE O/P EST MOD 30 MIN: CPT

## 2024-06-12 NOTE — PHYSICAL EXAM
[No Acute Distress] : no acute distress [Normal Oropharynx] : normal oropharynx [Normal Rate/Rhythm] : normal rate/rhythm [Normal S1, S2] : normal s1, s2

## 2024-06-15 PROBLEM — J18.9 CAP (COMMUNITY ACQUIRED PNEUMONIA): Status: ACTIVE | Noted: 2024-05-29

## 2024-06-15 NOTE — ASSESSMENT
[FreeTextEntry1] : 58 yo F w/ PMH who presents after hospitalization for community acquired pneumonia.   - fu CT chest in 8-12 weeks to ensure resolution  - persistent cough - possibly element of upper airway cough syndrome, post nasal gtt  - check PFTs   I spent 45  minutes during this encounter. Total time excludes separate billing services.

## 2024-06-15 NOTE — HISTORY OF PRESENT ILLNESS
[TextBox_4] : 59F with HTN who present to the hospital for 10 days of fever and dyspnea per request by her PCP. The fever and chills happened first and then was accompanied by a productive cough, congestion, nausea and diarrhea and poor happetite. As the days went on, she became dyspneic, especially on exertion. She saw urgent care on 05/11 where she got an xray and augmentin. On 05/14, she saw her PCP where she got another xray and bloodwork. Due to abnormal labs, chest xray findings and lack of resolution of symptoms her PCP recommended going to the emergency room for further management.  During hospitalization, xray of the chest showed reticular opacities. With the imaging findings, fevers, cough, high oxygen needs, diarrhea, and low sodium, treated for pneumonia with concern for legionella. The tests were negative for legionella, placed on 7- day course of azithromycin and 5-day course of ceftriaxone. With nebulized DuoNebs., Acappella, incentive spirometer, and o2 4L NC. With dirrhea, sodium was 123 and placed on salt tabs and fluid restriction. CTPE was ordered which was negative for pulmonary embolism.  Upon today's visit, patient is doing well overall report some residual cough which she is using Robitussin, is helping her cough. Currently denies SOB, wheezing, fever, chills, chest tightness, hoarseness, change in voice, or rhinorrhea. With no known history of childhood asthma, eczema, frequent URI, history of PNA, or intubation. With observed snoring during hospitalization, will F/U sleep study.  Has never smoked, with no pets, no occupational or environmental exposures.  Has appointment [facility] cardiologist tomorrow  + post nasal drip, taking claritin  - off oxygen -  3 weeks since discharge  + cough residual - has not visualized

## 2024-07-30 ENCOUNTER — OFFICE (OUTPATIENT)
Dept: URBAN - METROPOLITAN AREA CLINIC 109 | Facility: CLINIC | Age: 59
Setting detail: OPHTHALMOLOGY
End: 2024-07-30
Payer: COMMERCIAL

## 2024-07-30 DIAGNOSIS — H43.393: ICD-10-CM

## 2024-07-30 DIAGNOSIS — H40.013: ICD-10-CM

## 2024-07-30 DIAGNOSIS — H16.223: ICD-10-CM

## 2024-07-30 PROCEDURE — 92083 EXTENDED VISUAL FIELD XM: CPT | Performed by: OPHTHALMOLOGY

## 2024-07-30 PROCEDURE — 76514 ECHO EXAM OF EYE THICKNESS: CPT | Performed by: OPHTHALMOLOGY

## 2024-07-30 PROCEDURE — 92014 COMPRE OPH EXAM EST PT 1/>: CPT | Performed by: OPHTHALMOLOGY

## 2024-07-30 PROCEDURE — 92020 GONIOSCOPY: CPT | Performed by: OPHTHALMOLOGY

## 2024-07-30 PROCEDURE — 92250 FUNDUS PHOTOGRAPHY W/I&R: CPT | Performed by: OPHTHALMOLOGY

## 2024-07-30 ASSESSMENT — CONFRONTATIONAL VISUAL FIELD TEST (CVF)
OD_FINDINGS: FULL
OS_FINDINGS: FULL

## 2024-08-27 ENCOUNTER — APPOINTMENT (OUTPATIENT)
Dept: CT IMAGING | Facility: HOSPITAL | Age: 59
End: 2024-08-27

## 2024-08-27 ENCOUNTER — OUTPATIENT (OUTPATIENT)
Dept: OUTPATIENT SERVICES | Facility: HOSPITAL | Age: 59
LOS: 1 days | End: 2024-08-27
Payer: COMMERCIAL

## 2024-08-27 DIAGNOSIS — J18.9 PNEUMONIA, UNSPECIFIED ORGANISM: ICD-10-CM

## 2024-08-27 PROCEDURE — 71250 CT THORAX DX C-: CPT

## 2024-08-27 PROCEDURE — 71250 CT THORAX DX C-: CPT | Mod: 26

## 2024-09-03 ENCOUNTER — NON-APPOINTMENT (OUTPATIENT)
Age: 59
End: 2024-09-03

## 2024-09-04 ENCOUNTER — APPOINTMENT (OUTPATIENT)
Dept: PULMONOLOGY | Facility: CLINIC | Age: 59
End: 2024-09-04
Payer: COMMERCIAL

## 2024-09-04 PROCEDURE — ZZZZZ: CPT

## 2024-09-04 PROCEDURE — 99203 OFFICE O/P NEW LOW 30 MIN: CPT | Mod: GC

## 2024-09-04 NOTE — PHYSICAL EXAM
[No Acute Distress] : no acute distress [Normal Rate/Rhythm] : normal rate/rhythm [Normal S1, S2] : normal s1, s2 [No Murmurs] : no murmurs [No Resp Distress] : no resp distress [Clear to Auscultation Bilaterally] : clear to auscultation bilaterally [No Clubbing] : no clubbing [No Edema] : no edema [No Focal Deficits] : no focal deficits [Oriented x3] : oriented x3 [Normal Affect] : normal affect

## 2024-09-04 NOTE — END OF VISIT
[] : Fellow [FreeTextEntry3] : agree with above [Time Spent: ___ minutes] : I have spent [unfilled] minutes of time on the encounter which excludes teaching and separately reported services.

## 2024-09-04 NOTE — ASSESSMENT
[FreeTextEntry1] : 59F with HTN presenting for f/up from a prior PNA infection 4 mo ago, when she was admitted to the hospital and treated with abx; team was suspicious for legionella PNA although cx negative.    - today, feels well without complaints, at her baseline respiratory-wise.  - her repeat CT chest 8/27 pending formal read but clear with resolution of prior tree-in-bud and normal parenchyma -- f/up formal read by radx - PFTs not done due to high BP although at this point would opt not to proceed wiht PFTs as pt with CT scan resolution and back to her baseline - c/w HTN mng per cardiologist - no scheduled return, can RTC if needed

## 2024-09-04 NOTE — HISTORY OF PRESENT ILLNESS
[Never] : never [TextBox_4] : 59F with HTN presenting for f/up from a prior PNA infection 4 mo ago, when she was admitted to the hospital and treated with abx; team was suspicious for legionella PNA although cx negative. Completed course of abx and sent home -- today, feels well without complaints, at her baseline respiratory-wise. REpeat CT chest 8/27 pending formal read but clear with resolution of prior tree-in-bud and normal parenchyma. Also presented for PFTs although aborted due to her BP measured at 190/110.

## 2025-04-08 ENCOUNTER — OFFICE (OUTPATIENT)
Dept: URBAN - METROPOLITAN AREA CLINIC 109 | Facility: CLINIC | Age: 60
Setting detail: OPHTHALMOLOGY
End: 2025-04-08
Payer: COMMERCIAL

## 2025-04-08 DIAGNOSIS — H40.013: ICD-10-CM

## 2025-04-08 DIAGNOSIS — H16.223: ICD-10-CM

## 2025-04-08 PROCEDURE — 92133 CPTRZD OPH DX IMG PST SGM ON: CPT | Performed by: OPHTHALMOLOGY

## 2025-04-08 PROCEDURE — 92012 INTRM OPH EXAM EST PATIENT: CPT | Performed by: OPHTHALMOLOGY

## 2025-04-08 PROCEDURE — 92083 EXTENDED VISUAL FIELD XM: CPT | Performed by: OPHTHALMOLOGY

## 2025-04-08 ASSESSMENT — REFRACTION_CURRENTRX
OS_CYLINDER: -0.75
OD_VPRISM_DIRECTION: PROGS
OD_OVR_VA: 20/
OD_ADD: +2.25
OS_SPHERE: -4.00
OD_SPHERE: -3.50
OS_OVR_VA: 20/
OS_AXIS: 164
OD_CYLINDER: -0.25
OS_VPRISM_DIRECTION: PROGS
OS_ADD: +2.25
OD_AXIS: 083

## 2025-04-08 ASSESSMENT — REFRACTION_AUTOREFRACTION
OD_AXIS: 100
OS_AXIS: 174
OD_CYLINDER: -0.25
OD_SPHERE: -3.75
OS_CYLINDER: -0.50
OS_SPHERE: -4.00

## 2025-04-08 ASSESSMENT — CONFRONTATIONAL VISUAL FIELD TEST (CVF)
OS_FINDINGS: FULL
OD_FINDINGS: FULL

## 2025-04-08 ASSESSMENT — REFRACTION_MANIFEST
OD_SPHERE: -3.25
OS_SPHERE: -4.00
OS_ADD: +2.25
OD_CYLINDER: -0.25
OS_AXIS: 180
OD_ADD: +2.25
OS_CYLINDER: -1.00
OD_AXIS: 33

## 2025-04-08 ASSESSMENT — SUPERFICIAL PUNCTATE KERATITIS (SPK)
OD_SPK: 1+
OS_SPK: 1+

## 2025-04-08 ASSESSMENT — PACHYMETRY
OS_CT_CORRECTION: -6
OD_CT_CORRECTION: -4
OD_CT_UM: 608
OS_CT_UM: 633

## 2025-04-08 ASSESSMENT — VISUAL ACUITY
OD_BCVA: 20/25
OS_BCVA: 20/20

## 2025-04-08 ASSESSMENT — TONOMETRY
OD_IOP_MMHG: 18
OS_IOP_MMHG: 18